# Patient Record
Sex: FEMALE | Race: WHITE | HISPANIC OR LATINO | Employment: FULL TIME | URBAN - METROPOLITAN AREA
[De-identification: names, ages, dates, MRNs, and addresses within clinical notes are randomized per-mention and may not be internally consistent; named-entity substitution may affect disease eponyms.]

---

## 2024-10-31 ENCOUNTER — ANCILLARY PROCEDURE (OUTPATIENT)
Dept: OBGYN | Facility: CLINIC | Age: 39
End: 2024-10-31

## 2024-10-31 DIAGNOSIS — N91.2 AMENORRHEA, UNSPECIFIED: ICD-10-CM

## 2024-10-31 PROCEDURE — 76801 OB US < 14 WKS SINGLE FETUS: CPT | Performed by: OBSTETRICS & GYNECOLOGY

## 2024-11-01 ENCOUNTER — HOSPITAL ENCOUNTER (OUTPATIENT)
Facility: MEDICAL CENTER | Age: 39
End: 2024-11-01
Attending: MIDWIFE
Payer: COMMERCIAL

## 2024-11-01 ENCOUNTER — APPOINTMENT (OUTPATIENT)
Dept: OBGYN | Facility: CLINIC | Age: 39
End: 2024-11-01

## 2024-11-01 ENCOUNTER — INITIAL PRENATAL (OUTPATIENT)
Dept: OBGYN | Facility: CLINIC | Age: 39
End: 2024-11-01

## 2024-11-01 VITALS
DIASTOLIC BLOOD PRESSURE: 78 MMHG | SYSTOLIC BLOOD PRESSURE: 110 MMHG | WEIGHT: 165.4 LBS | BODY MASS INDEX: 29.3 KG/M2 | HEIGHT: 63 IN

## 2024-11-01 DIAGNOSIS — O09.519 ADVANCED MATERNAL AGE, PRIMIGRAVIDA, ANTEPARTUM: ICD-10-CM

## 2024-11-01 DIAGNOSIS — N89.8 VAGINAL ITCHING: ICD-10-CM

## 2024-11-01 DIAGNOSIS — O21.9 NAUSEA AND VOMITING IN PREGNANCY: ICD-10-CM

## 2024-11-01 PROCEDURE — 0500F INITIAL PRENATAL CARE VISIT: CPT | Performed by: MIDWIFE

## 2024-11-01 PROCEDURE — 3078F DIAST BP <80 MM HG: CPT | Performed by: MIDWIFE

## 2024-11-01 PROCEDURE — 3074F SYST BP LT 130 MM HG: CPT | Performed by: MIDWIFE

## 2024-11-01 RX ORDER — ONDANSETRON 4 MG/1
4 TABLET, FILM COATED ORAL EVERY 4 HOURS PRN
Qty: 20 TABLET | Refills: 0 | Status: SHIPPED | OUTPATIENT
Start: 2024-11-01

## 2024-11-01 ASSESSMENT — EDINBURGH POSTNATAL DEPRESSION SCALE (EPDS)
I HAVE BEEN SO UNHAPPY THAT I HAVE BEEN CRYING: ONLY OCCASIONALLY
I HAVE BEEN ABLE TO LAUGH AND SEE THE FUNNY SIDE OF THINGS: AS MUCH AS I ALWAYS COULD
I HAVE LOOKED FORWARD WITH ENJOYMENT TO THINGS: AS MUCH AS I EVER DID
I HAVE BEEN SO UNHAPPY THAT I HAVE HAD DIFFICULTY SLEEPING: NOT AT ALL
I HAVE FELT SAD OR MISERABLE: NO, NOT AT ALL
TOTAL SCORE: 6
THINGS HAVE BEEN GETTING ON TOP OF ME: NO, MOST OF THE TIME I HAVE COPED QUITE WELL
I HAVE BEEN ANXIOUS OR WORRIED FOR NO GOOD REASON: YES, SOMETIMES
I HAVE BLAMED MYSELF UNNECESSARILY WHEN THINGS WENT WRONG: YES, SOME OF THE TIME
I HAVE FELT SCARED OR PANICKY FOR NO GOOD REASON: NO, NOT AT ALL
THE THOUGHT OF HARMING MYSELF HAS OCCURRED TO ME: NEVER

## 2024-11-01 NOTE — LETTER
November 1, 2024              Lina Lazo is currently being cared for at [facility].  Her hours/activities need to be modified.  She should not lift over 20 pounds repetitively.          Thank you,          Nai Goff C.N.M.    Electronically Signed

## 2024-11-01 NOTE — PROGRESS NOTES
Risk factors:   AMA  Referrals made today:   None    Subjective:   Lina Lazo is a 38 y.o.  who presents for her new OB exam.  She is 10w1d with an MARCELO of Estimated Date of Delivery: 25 by LMP c/w 10 wk US.   She is feeling well but is having daily headaches - drinking one liter water daily. Reports pelvic pain since ultrasound yesterday - denies vaginal bleeding but has vaginal itching and odor. Reports vomiting everyday - occurs spontaneously without specific triggers. She reports no ER visits or previous care in this pregnancy. Reports no fetal movement.     Vaginal bleeding denies  Pain   Reports - see HPI  Cramping  denies  History of thyroid disease denies  History of high blood pressure denies  History of uterine surgery denies    Pre-eclampsia risk factors:   1 of the following: No Previous pregnancy with pre-eclampsia, Type 1 or 2 diabetes, Chronic hypertension, Multifetal gestation, Antiphospholipid syndrome, and Systemic Lupus erythematosus  2 of the following: nulliparity and Age greater than or equal to 35 years    Diabetes risk factors:   One of the following: Greater than 36 yo    BMI greater than or equal to 25 and one of the following: First degree relative with diabetes and High risk ethnicity    FOB is involved. His name is Carlitos.    Pregnancy is  planned and desired.    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     Denies alcohol use, drug use, or tobacco use in pregnancy.     Denies any current or hx of sexual, emotional or physical abuse or trauma.     Current Medications: PNV    Allergies: Denies    Past Medical History:   Diagnosis Date    Allergy        OB History    Para Term  AB Living   1             SAB IAB Ectopic Molar Multiple Live Births                    # Outcome Date GA Lbr Balbir/2nd Weight Sex Type Anes PTL Lv   1 Current                 Past Surgical History:   Procedure  "Laterality Date    LIPOSUCTION      2016        Gynecological History  STIs  Denies  HSV  Denies  Abnormal pap Denies    Psych History   Depression:  Denies  Anxiety   Denies  Bipolar   Denies  Postpartum Mood Changes NA      Family History   Problem Relation Age of Onset    No Known Problems Mother     Cancer Father     Diabetes Father     No Known Problems Maternal Grandmother     No Known Problems Maternal Grandfather     No Known Problems Paternal Grandmother     No Known Problems Paternal Grandfather      Reports down syndrome in patient's brother genetic disorders in family history.     Objective:      Vitals:    11/01/24 1241 11/01/24 1252   BP: 110/78    Weight: 165 lb 6.4 oz    Height:  5' 3\"        Physical Exam  Vitals reviewed. Exam conducted with a chaperone present.   Constitutional:       General: She is not in acute distress.     Appearance: Normal appearance.   HENT:      Head: Normocephalic.   Neck:      Thyroid: No thyroid mass, thyromegaly or thyroid tenderness.   Cardiovascular:      Rate and Rhythm: Normal rate and regular rhythm.      Heart sounds: Normal heart sounds.   Pulmonary:      Effort: Pulmonary effort is normal.      Breath sounds: Normal breath sounds.   Chest:   Breasts:     Right: Normal.      Left: Normal.   Abdominal:      General: Abdomen is flat.      Palpations: Abdomen is soft.      Tenderness: There is no abdominal tenderness.   Genitourinary:     General: Normal vulva.      Exam position: Lithotomy position.      Labia:         Right: No rash, tenderness, lesion or injury.         Left: No rash, tenderness, lesion or injury.       Urethra: No prolapse, urethral pain, urethral swelling or urethral lesion.      Vagina: Normal.      Cervix: Normal.      Uterus: Normal.       Adnexa: Right adnexa normal and left adnexa normal.      Rectum: Normal.   Musculoskeletal:         General: Normal range of motion.      Cervical back: Normal range of motion.   Lymphadenopathy:      " Cervical: No cervical adenopathy.      Upper Body:      Right upper body: No supraclavicular, axillary or pectoral adenopathy.      Left upper body: No supraclavicular, axillary or pectoral adenopathy.      Lower Body: No right inguinal adenopathy. No left inguinal adenopathy.   Skin:     General: Skin is warm and dry.   Neurological:      Mental Status: She is alert and oriented to person, place, and time.   Psychiatric:         Attention and Perception: Attention normal.         Mood and Affect: Mood normal.         Speech: Speech normal.         Behavior: Behavior normal.         Cognition and Memory: Cognition normal.       Bedside US shows SIUP with positive cardiac activity      See Prenatal Flowsheet for vitals    A/P:     Problem List Items Addressed This Visit          Unprioritized    Advanced maternal age, primigravida, antepartum     Assessment:  1. 38 y.o.  with IUP @ 10w1d per LMP  2.  S=D  3. See problem list for conditions affecting pregnancy    Plan:   - Pap smear and GC/CT swab collected today   - Discussed carrier screening and genetic testing in pregnancy. Patient desires AFP tetra  - Prenatal labs ordered - lab slip provided  - Discussed PNV, nutrition, adequate water intake, and exercise/weight gain in pregnancy  - S/sx of pregnancy warning signs and PTL precautions given  - Patient is a candidate for low dose aspirin use in pregnancy. She is educated on Aspirin 81mg once daily from 12-36 wks of pregnancy.  - Patient is a candidate for early diabetes testing in pregnancy. A one hour glucose tolerance test is ordered today.  - Discuss headaches: Increase hydration and add elytes. Start magnesium oxide 400mg daily to prevent headaches.   - Discuss vaginal infection prevention. Vaginal pathogen collected  - Discuss prevention of vomiting. Advise Unisom nightly. Zofran prn.   - Complete OB US in 10 wks  - Return to clinic in 4 weeks.           Relevant Orders    US-OB 2ND 3RD TRI COMPLETE     URINE DRUG SCREEN W/CONF (AR)    PREG CNTR PRENATAL PN    THINPREP PAP W/HPV AND CTNG    GLUCOSE 1HR GESTATIONAL    US-OB 2ND 3RD TRI COMPLETE     Other Visit Diagnoses       Vaginal itching        Relevant Orders    VAGINAL PATHOGENS DNA PANEL    Nausea and vomiting in pregnancy        Relevant Medications    ondansetron (ZOFRAN) 4 MG Tab tablet             ELLEN Mtaias.N.M.

## 2024-11-01 NOTE — ASSESSMENT & PLAN NOTE
Assessment:  1. 38 y.o.  with IUP @ 10w1d per LMP  2.  S=D  3. See problem list for conditions affecting pregnancy    Plan:   - Pap smear and GC/CT swab collected today   - Discussed carrier screening and genetic testing in pregnancy. Patient desires AFP tetra  - Prenatal labs ordered - lab slip provided  - Discussed PNV, nutrition, adequate water intake, and exercise/weight gain in pregnancy  - S/sx of pregnancy warning signs and PTL precautions given  - Patient is a candidate for low dose aspirin use in pregnancy. She is educated on Aspirin 81mg once daily from 12-36 wks of pregnancy.  - Patient is a candidate for early diabetes testing in pregnancy. A one hour glucose tolerance test is ordered today.  - Discuss headaches: Increase hydration and add elytes. Start magnesium oxide 400mg daily to prevent headaches.   - Discuss vaginal infection prevention. Vaginal pathogen collected  - Discuss prevention of vomiting. Advise Unisom nightly. Zofran prn.   - Complete OB US in 10 wks  - Return to clinic in 4 weeks.

## 2024-11-01 NOTE — PATIENT INSTRUCTIONS
Please start taking Low dose Aspirin (81 mg) daily from 12 weeks pregnancy to 36 weeks pregnancy. You will be 12 weeks pregnant on 11/14/24.    Take Magnesium Oxide 400 mg daily for headache prevention. Drink 3-4 liters of water daily with electrolytes.     Prevention of vaginal infections: avoiding scented products on or around vagina, switching to different condoms if used, avoid douching, and to use different razor each time if shaving pubic hair and or change to waxing instead.

## 2024-11-01 NOTE — PROGRESS NOTES
Pt here for NOB apt.   Pt states : vaginal pain, and headaches    (OB Prenatal Package Plan) ?  Yes   Last pap : has not had one done but will get one today  LMP : 08/22/2024  MARCELO : 05/29/2025 by LMP   US : 10/31/2024  GA today : 10w 1d by LMP  EPDS : 6  Pt is interested in genetic testing (AFP)  Phone : [phone]   Pharm verified.   Vaginal Symptoms : none   Flu Vaccine : possibly next visit    : [id number]

## 2024-11-02 LAB
C TRACH DNA GENITAL QL NAA+PROBE: NEGATIVE
CANDIDA DNA VAG QL PROBE+SIG AMP: NEGATIVE
G VAGINALIS DNA VAG QL PROBE+SIG AMP: NEGATIVE
N GONORRHOEA DNA GENITAL QL NAA+PROBE: NEGATIVE
SPECIMEN SOURCE: NORMAL
T VAGINALIS DNA VAG QL PROBE+SIG AMP: NEGATIVE

## 2024-11-09 LAB
HPV I/H RISK 1 DNA SPEC QL NAA+PROBE: NOT DETECTED
SPECIMEN SOURCE: NORMAL
THINPREP PAP, CYTOLOGY NL11781: NORMAL

## 2024-12-11 ENCOUNTER — APPOINTMENT (OUTPATIENT)
Dept: OBGYN | Facility: CLINIC | Age: 39
End: 2024-12-11
Payer: MEDICAID

## 2024-12-11 VITALS — DIASTOLIC BLOOD PRESSURE: 74 MMHG | SYSTOLIC BLOOD PRESSURE: 110 MMHG | BODY MASS INDEX: 29.41 KG/M2 | WEIGHT: 166 LBS

## 2024-12-11 DIAGNOSIS — O09.519 ADVANCED MATERNAL AGE, PRIMIGRAVIDA, ANTEPARTUM: ICD-10-CM

## 2024-12-11 PROCEDURE — 0502F SUBSEQUENT PRENATAL CARE: CPT | Performed by: NURSE PRACTITIONER

## 2024-12-11 PROCEDURE — 3078F DIAST BP <80 MM HG: CPT | Performed by: NURSE PRACTITIONER

## 2024-12-11 PROCEDURE — 3074F SYST BP LT 130 MM HG: CPT | Performed by: NURSE PRACTITIONER

## 2024-12-11 NOTE — PROGRESS NOTES
OB follow up   + fetal movement.  No VB, LOF or UC's.  Phone # [phone]  Preferred pharmacy confirmed.  PNP, 1 GTT not done yet  US scheduled for 1/20/24

## 2024-12-12 ENCOUNTER — HOSPITAL ENCOUNTER (OUTPATIENT)
Dept: LAB | Facility: MEDICAL CENTER | Age: 39
End: 2024-12-12
Attending: NURSE PRACTITIONER
Payer: COMMERCIAL

## 2024-12-12 ENCOUNTER — HOSPITAL ENCOUNTER (OUTPATIENT)
Dept: LAB | Facility: MEDICAL CENTER | Age: 39
End: 2024-12-12
Attending: MIDWIFE
Payer: COMMERCIAL

## 2024-12-12 DIAGNOSIS — O09.519 ADVANCED MATERNAL AGE, PRIMIGRAVIDA, ANTEPARTUM: ICD-10-CM

## 2024-12-12 LAB
ABO GROUP BLD: NORMAL
BLD GP AB SCN SERPL QL: NORMAL
RH BLD: NORMAL

## 2024-12-13 LAB
ERYTHROCYTE [DISTWIDTH] IN BLOOD BY AUTOMATED COUNT: 44.7 FL (ref 35.9–50)
GLUCOSE 1H P 50 G GLC PO SERPL-MCNC: 107 MG/DL (ref 70–139)
HBV SURFACE AG SER QL: ABNORMAL
HCT VFR BLD AUTO: 37.7 % (ref 37–47)
HCV AB SER QL: ABNORMAL
HGB BLD-MCNC: 13.4 G/DL (ref 12–16)
HIV 1+2 AB+HIV1 P24 AG SERPL QL IA: NORMAL
MCH RBC QN AUTO: 33.3 PG (ref 27–33)
MCHC RBC AUTO-ENTMCNC: 35.5 G/DL (ref 32.2–35.5)
MCV RBC AUTO: 93.5 FL (ref 81.4–97.8)
PLATELET # BLD AUTO: 181 K/UL (ref 164–446)
PMV BLD AUTO: 10.9 FL (ref 9–12.9)
RBC # BLD AUTO: 4.03 M/UL (ref 4.2–5.4)
RUBV AB SER QL: 130 IU/ML
T PALLIDUM AB SER QL IA: ABNORMAL
WBC # BLD AUTO: 11 K/UL (ref 4.8–10.8)

## 2024-12-14 LAB
# FETUSES US: NORMAL
AFP MOM SERPL: 0.97
AFP SERPL-MCNC: 31 NG/ML
AGE - REPORTED: 37.5 YR
AMPHET CTO UR CFM-MCNC: NEGATIVE NG/ML
BACTERIA UR CULT: NORMAL
BARBITURATES CTO UR CFM-MCNC: NEGATIVE NG/ML
BENZODIAZ CTO UR CFM-MCNC: NEGATIVE NG/ML
CANNABINOIDS CTO UR CFM-MCNC: NEGATIVE NG/ML
COCAINE CTO UR CFM-MCNC: NEGATIVE NG/ML
CREAT UR-MCNC: 163.9 MG/DL (ref 20–400)
CURRENT SMOKER: NO
DRUG COMMENT 753798: NORMAL
FAMILY MEMBER DISEASES HX: NO
GA METHOD: NORMAL
GA: NORMAL WK
HCG MOM SERPL: 1.2
HCG SERPL-ACNC: NORMAL IU/L
HX OF HEREDITARY DISORDERS: NO
IDDM PATIENT QL: NO
INHIBIN A MOM SERPL: 1.3
INHIBIN A SERPL-MCNC: 215 PG/ML
INTEGRATED SCN PATIENT-IMP: NORMAL
METHADONE CTO UR CFM-MCNC: NEGATIVE NG/ML
OPIATES CTO UR CFM-MCNC: NEGATIVE NG/ML
PATHOLOGY STUDY: NORMAL
PCP CTO UR CFM-MCNC: NEGATIVE NG/ML
PROPOXYPH CTO UR CFM-MCNC: NEGATIVE NG/ML
SIGNIFICANT IND 70042: NORMAL
SITE SITE: NORMAL
SOURCE SOURCE: NORMAL
SPECIMEN DRAWN SERPL: NORMAL
U ESTRIOL MOM SERPL: 0.83
U ESTRIOL SERPL-MCNC: 1.03 NG/ML

## 2025-01-08 ENCOUNTER — ROUTINE PRENATAL (OUTPATIENT)
Dept: OBGYN | Facility: CLINIC | Age: 40
End: 2025-01-08

## 2025-01-08 VITALS — DIASTOLIC BLOOD PRESSURE: 70 MMHG | WEIGHT: 168 LBS | BODY MASS INDEX: 29.76 KG/M2 | SYSTOLIC BLOOD PRESSURE: 110 MMHG

## 2025-01-08 DIAGNOSIS — O09.519 ADVANCED MATERNAL AGE, PRIMIGRAVIDA, ANTEPARTUM: ICD-10-CM

## 2025-01-08 PROCEDURE — 0502F SUBSEQUENT PRENATAL CARE: CPT | Performed by: NURSE PRACTITIONER

## 2025-01-08 PROCEDURE — 3074F SYST BP LT 130 MM HG: CPT | Performed by: NURSE PRACTITIONER

## 2025-01-08 PROCEDURE — 3078F DIAST BP <80 MM HG: CPT | Performed by: NURSE PRACTITIONER

## 2025-01-08 NOTE — PROGRESS NOTES
OB follow up   no fetal movement yet.  No VB, LOF or UC's.  Phone # [phone]  Preferred pharmacy confirmed.  PNP, AFP tetra done. US scheduled for 1/20/24  C/o pain on hands, and also numbness

## 2025-01-20 ENCOUNTER — APPOINTMENT (OUTPATIENT)
Dept: OBGYN | Facility: CLINIC | Age: 40
End: 2025-01-20
Attending: MIDWIFE
Payer: MEDICAID

## 2025-01-20 VITALS
HEART RATE: 100 BPM | SYSTOLIC BLOOD PRESSURE: 128 MMHG | BODY MASS INDEX: 30.22 KG/M2 | DIASTOLIC BLOOD PRESSURE: 81 MMHG | WEIGHT: 170.6 LBS

## 2025-01-20 DIAGNOSIS — O09.519 ADVANCED MATERNAL AGE, PRIMIGRAVIDA, ANTEPARTUM: ICD-10-CM

## 2025-01-20 PROCEDURE — 76805 OB US >/= 14 WKS SNGL FETUS: CPT | Performed by: OBSTETRICS & GYNECOLOGY

## 2025-01-24 PROBLEM — O28.3 ABNORMAL PREGNANCY US: Status: ACTIVE | Noted: 2025-01-24

## 2025-01-24 NOTE — RESULT ENCOUNTER NOTE
Normal fetal anatomy and growth except for renal pelviectasis. Repeat ultrasound scheduled and patient aware.

## 2025-02-05 ENCOUNTER — ROUTINE PRENATAL (OUTPATIENT)
Dept: OBGYN | Facility: CLINIC | Age: 40
End: 2025-02-05

## 2025-02-05 VITALS — DIASTOLIC BLOOD PRESSURE: 70 MMHG | BODY MASS INDEX: 30.47 KG/M2 | WEIGHT: 172 LBS | SYSTOLIC BLOOD PRESSURE: 120 MMHG

## 2025-02-05 DIAGNOSIS — M79.89 SWELLING OF BOTH HANDS: ICD-10-CM

## 2025-02-05 DIAGNOSIS — O09.519 ADVANCED MATERNAL AGE, PRIMIGRAVIDA, ANTEPARTUM: ICD-10-CM

## 2025-02-05 PROCEDURE — 0502F SUBSEQUENT PRENATAL CARE: CPT

## 2025-02-05 PROCEDURE — 3074F SYST BP LT 130 MM HG: CPT

## 2025-02-05 PROCEDURE — 3078F DIAST BP <80 MM HG: CPT

## 2025-02-05 NOTE — PROGRESS NOTES
Pt here today for OBFV   +FM movemnet  No VB, LOF. 's   Phone number [phone] (home)   Pharmacy verified   3rd trimester lab orders pended and instructions given to patient  US 3/10

## 2025-02-05 NOTE — PROGRESS NOTES
S: Pt is a 39 y.o.  at 23w6d gestation here today for routine prenatal care. Pt reports fetal movement; denies cramping, vaginal bleeding, and leaking of fluid. Concerns today include lots of pain in her hands. Reports increased swelling, takes Tylenol with little relief.       O: /70   Wt 172 lb    *see prenatal flowsheet*     A: IUP at 23w6d       S=D         Patient Active Problem List    Diagnosis Date Noted    Swelling of both hands 2025    Abnormal pregnancy US 2025    Advanced maternal age, primigravida, antepartum 2024       P:   -Discussed normal s/sx pregnancy appropriate for gestational age general discomforts vs warning signs that necessitate evaluation in OB triage. Reviewed fetal movements appropriate for EGA. Reinforced adequate hydration and nutrition.  -Discussed relief measures for hand swelling and carpal tunnel, including wrist brace, increased hydration, raising hands to drain fluid, etc.   -Baby aspirin daily for preE prophylaxis  -Has growth scan scheduled at 32 weeks (3/10) for AMA  -Third trimester labs ordered today to complete in 2-3 weeks  -RTC in 4 weeks for routine prenatal care      Molly Oleary C.N.M.

## 2025-02-24 ENCOUNTER — HOSPITAL ENCOUNTER (OUTPATIENT)
Dept: LAB | Facility: MEDICAL CENTER | Age: 40
End: 2025-02-24
Payer: COMMERCIAL

## 2025-02-24 DIAGNOSIS — O09.519 ADVANCED MATERNAL AGE, PRIMIGRAVIDA, ANTEPARTUM: ICD-10-CM

## 2025-02-24 LAB
ERYTHROCYTE [DISTWIDTH] IN BLOOD BY AUTOMATED COUNT: 48.2 FL (ref 35.9–50)
GLUCOSE 1H P 50 G GLC PO SERPL-MCNC: 154 MG/DL (ref 70–139)
HCT VFR BLD AUTO: 37.3 % (ref 37–47)
HGB BLD-MCNC: 12.6 G/DL (ref 12–16)
MCH RBC QN AUTO: 32.7 PG (ref 27–33)
MCHC RBC AUTO-ENTMCNC: 33.8 G/DL (ref 32.2–35.5)
MCV RBC AUTO: 96.9 FL (ref 81.4–97.8)
PLATELET # BLD AUTO: 172 K/UL (ref 164–446)
PMV BLD AUTO: 11.7 FL (ref 9–12.9)
RBC # BLD AUTO: 3.85 M/UL (ref 4.2–5.4)
T PALLIDUM AB SER QL IA: NORMAL
WBC # BLD AUTO: 12.2 K/UL (ref 4.8–10.8)

## 2025-02-26 ENCOUNTER — RESULTS FOLLOW-UP (OUTPATIENT)
Dept: OBGYN | Facility: CLINIC | Age: 40
End: 2025-02-26
Payer: MEDICAID

## 2025-02-26 DIAGNOSIS — O99.810 ABNORMAL GLUCOSE AFFECTING PREGNANCY: ICD-10-CM

## 2025-02-27 NOTE — TELEPHONE ENCOUNTER
----- Message from Midwife Molly Oleary C.N.M. sent at 2/26/2025  7:59 PM PST -----  Please call to notify pt of elevated 1hr glucose test, 3hr has been ordered.      Pt notified of abnormal 1hr gtt and need to do 3hr gtt this time. Pt instructed to fast 10-12hr prior to testing. Pt informed she is only allow to drink plain water during fasting time. Advised to bring a snack for after the test is done. Pt notified will be staying in the labs for the 3hr. Pt agreed to do it tomorrow 2/28/2025. Pt verbalized understanding.

## 2025-03-06 ENCOUNTER — APPOINTMENT (OUTPATIENT)
Dept: OBGYN | Facility: CLINIC | Age: 40
End: 2025-03-06
Payer: MEDICAID

## 2025-03-06 VITALS — WEIGHT: 176.6 LBS | DIASTOLIC BLOOD PRESSURE: 80 MMHG | BODY MASS INDEX: 31.28 KG/M2 | SYSTOLIC BLOOD PRESSURE: 118 MMHG

## 2025-03-06 DIAGNOSIS — R60.9 SWOLLEN: ICD-10-CM

## 2025-03-06 DIAGNOSIS — O09.519 ADVANCED MATERNAL AGE, PRIMIGRAVIDA, ANTEPARTUM: ICD-10-CM

## 2025-03-06 LAB
APPEARANCE UR: CLEAR
BILIRUB UR STRIP-MCNC: NEGATIVE MG/DL
COLOR UR AUTO: NORMAL
GLUCOSE UR STRIP.AUTO-MCNC: NEGATIVE MG/DL
KETONES UR STRIP.AUTO-MCNC: NEGATIVE MG/DL
LEUKOCYTE ESTERASE UR QL STRIP.AUTO: NEGATIVE
NITRITE UR QL STRIP.AUTO: NEGATIVE
PH UR STRIP.AUTO: 6 [PH] (ref 5–8)
PROT UR QL STRIP: NEGATIVE MG/DL
RBC UR QL AUTO: NEGATIVE
SP GR UR STRIP.AUTO: 1.03
UROBILINOGEN UR STRIP-MCNC: 0.2 MG/DL

## 2025-03-06 PROCEDURE — 90471 IMMUNIZATION ADMIN: CPT | Performed by: PHYSICIAN ASSISTANT

## 2025-03-06 PROCEDURE — 90715 TDAP VACCINE 7 YRS/> IM: CPT | Mod: JZ | Performed by: PHYSICIAN ASSISTANT

## 2025-03-06 PROCEDURE — 81002 URINALYSIS NONAUTO W/O SCOPE: CPT | Performed by: PHYSICIAN ASSISTANT

## 2025-03-06 PROCEDURE — 0502F SUBSEQUENT PRENATAL CARE: CPT | Performed by: PHYSICIAN ASSISTANT

## 2025-03-06 NOTE — PROGRESS NOTES
Pt. Here for OB/F/U, Kick Count sheet given and explained to pt.   Growth U/S on 3/10/2025  Good FM  Good # [phone]   Pt states her hands and ankles very swollen and stomach gets really.    Tdap given today   Pt states will do 3 HR GTT tomorrow.

## 2025-03-06 NOTE — PROGRESS NOTES
Pt has no complaints with cramping, bleeding or pain, though pt does have occ lower abd cramping, carlos at work, as she is working 4-12 hr days per week, all night shift and is on her feet that whole time. Pt to work on incr water, cutting back salt and try stretching, compression stockings prn, as pt has had incr swelling, likely due to slight dehydration. +FM - FKC sheet given today. 1hr , though CBC, T pall wnl - to do 3hr GTT tomorrow. PTL precautions reviewed. TDaP given today. US 3/10 for fetal pyelectasis. RTC 2-4 wk or sooner prn.

## 2025-03-06 NOTE — LETTER
Cuente los Movimientos de [place] Beb[place]  Otro paso importante para la jacki de [place] bebé    Linacecelia Griffin Lazo     [facility]            Dept: [phone]    ¿Cuántas semanas tiene de embarazo? 28w0d    Fecha cuando tiene que comenzar a contar el movimiento: 3/6/2025                  [place] debe usar irma diagrama    Lauren manera en que [place] doctor puede controlar a jacki de [place] bebé es sabiendo cuantas veces se mueve [place] bebé en el útero, o por medio de las “pataditas”.  Usted podrá ayudarle a [place] médico al usar cada día el siguiente diagrama.    Cada día, usted debe prestar atención a cuantas horas le lleva a [place] bebé moverse 10 veces.  Comience a contar en la mañana, lo antes posible después de haberse levantado.    Primeramente, escriba la hora en que se mueve [place] bebé, hasta llegar a 10 veces.  Colóquele un check o palomita a cada cuadrito cada vez que [place] bebé se mueva hasta que complete 10 veces.  Escriba la hora cuando termine de contar 10 veces en la última columna.  Sume el total del tiempo que le llevó contar los 10 movimientos.  Finalmente, complete el cuadrito de cuantas horas le llevó hacerlo.    Después de tasha contado los 10 movimientos, ya no tendrá que contar los demás movimientos por el michael del día.  A la mañana siguiente, comience a contar de nuevo cuantas veces se mueve el bebé desde el momento en que se levante.    ¿Qué tendría que considerarse un “movimiento”?  Es difícil de decirlo porque es distinto de lauren madre a otra, y de un embarazo a otro.  Lo importante es que cuente el movimiento de la misma manera mir el transcurso de [place] embarazo.  Si tiene preguntas adicionales, pregúntele a [place] doctor.    ¡Cuente cuidadosamente cada día!     MUESTRA:  Semana 28    ¿Cuántas horas le ha llevado sentir 10 movimientos?        Hora de Inicio     1     2     3     4     5     6     7     8     9     10   Hora de Finlizar   Edwina. 8:20           11:40   Mar.                Mié.               Jue.               Vie.               Sáb.               Dom.                 IMPORTANTE:  Usted debe contactar a [place] doctor si le lleva más de 2 horas sentir 10 movimientos de washing[place] bebé.    Cada mañana, escriba la hora de inicio y comience a contar los movimientos de [place] bebé.  Hágalo colocándole un check o palomita a cada cuadrito cada vez que sienta un movimiento de [place] bebé.  Cuando haya sentido 10 “pataditas”, escriba la hora en que terminó de contar en la última columna.  Luego, complete en la cajita (arriba de la delores de check o palomita) el número total de horas que le llevó hacerlo.  Asegúrese de leer completamente las instrucciones en la página anterior.

## 2025-03-07 ENCOUNTER — HOSPITAL ENCOUNTER (OUTPATIENT)
Dept: LAB | Facility: MEDICAL CENTER | Age: 40
End: 2025-03-07
Payer: COMMERCIAL

## 2025-03-07 DIAGNOSIS — O99.810 ABNORMAL GLUCOSE AFFECTING PREGNANCY: ICD-10-CM

## 2025-03-07 LAB
GLUCOSE 1H P CHAL SERPL-MCNC: 156 MG/DL (ref 65–180)
GLUCOSE 2H P CHAL SERPL-MCNC: 134 MG/DL (ref 65–155)
GLUCOSE 3H P CHAL SERPL-MCNC: 103 MG/DL (ref 65–140)
GLUCOSE BS SERPL-MCNC: 85 MG/DL (ref 65–95)

## 2025-03-10 ENCOUNTER — ANCILLARY PROCEDURE (OUTPATIENT)
Dept: OBGYN | Facility: CLINIC | Age: 40
End: 2025-03-10

## 2025-03-10 VITALS
WEIGHT: 173.4 LBS | DIASTOLIC BLOOD PRESSURE: 83 MMHG | BODY MASS INDEX: 30.72 KG/M2 | HEART RATE: 102 BPM | SYSTOLIC BLOOD PRESSURE: 124 MMHG

## 2025-03-10 DIAGNOSIS — Z34.02 ENCOUNTER FOR SUPERVISION OF NORMAL FIRST PREGNANCY IN SECOND TRIMESTER: ICD-10-CM

## 2025-03-10 PROCEDURE — 76816 OB US FOLLOW-UP PER FETUS: CPT | Performed by: OBSTETRICS & GYNECOLOGY

## 2025-04-04 ENCOUNTER — ROUTINE PRENATAL (OUTPATIENT)
Dept: OBGYN | Facility: CLINIC | Age: 40
End: 2025-04-04

## 2025-04-04 VITALS — WEIGHT: 179.2 LBS | BODY MASS INDEX: 31.74 KG/M2 | DIASTOLIC BLOOD PRESSURE: 84 MMHG | SYSTOLIC BLOOD PRESSURE: 120 MMHG

## 2025-04-04 DIAGNOSIS — O28.3 ABNORMAL PREGNANCY US: ICD-10-CM

## 2025-04-04 DIAGNOSIS — Z34.03 PRENATAL CARE, FIRST PREGNANCY IN THIRD TRIMESTER: Primary | ICD-10-CM

## 2025-04-04 PROBLEM — Z34.93 PRENATAL CARE IN THIRD TRIMESTER: Status: ACTIVE | Noted: 2025-04-04

## 2025-04-04 PROCEDURE — 0502F SUBSEQUENT PRENATAL CARE: CPT

## 2025-04-04 PROCEDURE — 3074F SYST BP LT 130 MM HG: CPT

## 2025-04-04 PROCEDURE — 3079F DIAST BP 80-89 MM HG: CPT

## 2025-04-04 NOTE — PROGRESS NOTES
S: Pt is a 39 y.o.  at 32w1d gestation here today for routine prenatal care.     Concerns today include:   Reports back and ligament pain. Reports jaw pain.      Denies: vaginal bleeding, pelvic and abdominal pain, cramping, contractions, leaking of fluid, urinary and vaginal symptoms and headaches, visual changes, epigastric pain    Pt reports fetal movement as Present     O: /84   Wt 179 lb 3.2 oz   LMP 2024 (Exact Date)   BMI 31.74 kg/m²    *see prenatal flowsheet*     Labs:     Prenatal labs: Completed and normal  GCT: 1hr 154, 3hr WNL   GBS: Not yet collected  Genetic testing: declined  STI testing: negative    Ultrasound: 3/10: ac 38%, efw 34%, allie 15.0 cm. Fetal pyelectasis resolved.    A/P:     Problem List Items Addressed This Visit       Abnormal pregnancy US    Prenatal care in third trimester - Primary    Pt is a 39 y.o.  at 32w1d gestation here today for routine prenatal care.   Size equal to dates    Discuss  labor and pre-eclampsia precautions.  Discuss FMA and FKCs  Address concerns: handout given and discussed regarding back and ligament pain. Advised a pregnancy belt. Pt advised to see a dentist for jaw pain.   GBS Not yet collected  Rh positive  Tdap: Administered prior visit.  Reviewed 3rd tri labs: yes  Desires tubal salpingectomy if a C/S necessary  for contraception postpartum. Consent signed today.  RTC 2 wks.   WIC handout given and discussed.  REMSA car seat information given and discussed.  PP contraception handout given and discussed.  [facility] class/tour information given and discussed.  Discussed NSTs starting at 36 weeks.

## 2025-04-04 NOTE — ASSESSMENT & PLAN NOTE
Pt is a 39 y.o.  at 32w1d gestation here today for routine prenatal care.   Size equal to dates    Discuss  labor and pre-eclampsia precautions.  Discuss FMA and FKCs  Address concerns: handout given and discussed regarding back and ligament pain. Advised a pregnancy belt. Pt advised to see a dentist for jaw pain.   GBS Not yet collected  Rh positive  Tdap: Administered prior visit.  Reviewed 3rd tri labs: yes  Desires tubal salpingectomy if a C/S necessary  for contraception postpartum. Consent signed today.  RTC 2 wks.   WIC handout given and discussed.  REMSA car seat information given and discussed.  PP contraception handout given and discussed.  [facility] class/tour information given and discussed.  Discussed NSTs starting at 36 weeks.

## 2025-04-04 NOTE — PROGRESS NOTES
Pt here today for OBFV   +FM movemnet  No VB, LOF. Uc's   Phone number [phone] (home)   Pharmacy verified   Pt has been experiencing jaw pain for a while now

## 2025-04-18 ENCOUNTER — ROUTINE PRENATAL (OUTPATIENT)
Dept: OBGYN | Facility: CLINIC | Age: 40
End: 2025-04-18

## 2025-04-18 VITALS — BODY MASS INDEX: 31.46 KG/M2 | WEIGHT: 177.6 LBS | DIASTOLIC BLOOD PRESSURE: 70 MMHG | SYSTOLIC BLOOD PRESSURE: 102 MMHG

## 2025-04-18 DIAGNOSIS — O26.893 HEADACHE IN PREGNANCY, ANTEPARTUM, THIRD TRIMESTER: ICD-10-CM

## 2025-04-18 DIAGNOSIS — O09.513 PRIMIGRAVIDA OF ADVANCED MATERNAL AGE IN THIRD TRIMESTER: ICD-10-CM

## 2025-04-18 DIAGNOSIS — R51.9 HEADACHE IN PREGNANCY, ANTEPARTUM, THIRD TRIMESTER: ICD-10-CM

## 2025-04-18 DIAGNOSIS — R10.2 PELVIC PRESSURE IN PREGNANCY: ICD-10-CM

## 2025-04-18 DIAGNOSIS — O26.899 PELVIC PRESSURE IN PREGNANCY: ICD-10-CM

## 2025-04-18 LAB
APPEARANCE UR: CLEAR
BILIRUB UR STRIP-MCNC: NORMAL MG/DL
COLOR UR AUTO: YELLOW
GLUCOSE UR STRIP.AUTO-MCNC: NORMAL MG/DL
KETONES UR STRIP.AUTO-MCNC: NORMAL MG/DL
LEUKOCYTE ESTERASE UR QL STRIP.AUTO: NORMAL
NITRITE UR QL STRIP.AUTO: NORMAL
PH UR STRIP.AUTO: 7 [PH] (ref 5–8)
PROT UR QL STRIP: 30 MG/DL
RBC UR QL AUTO: NORMAL
SP GR UR STRIP.AUTO: 1.02
UROBILINOGEN UR STRIP-MCNC: 0.2 MG/DL

## 2025-04-18 PROCEDURE — 3074F SYST BP LT 130 MM HG: CPT | Performed by: OBSTETRICS & GYNECOLOGY

## 2025-04-18 PROCEDURE — 3078F DIAST BP <80 MM HG: CPT | Performed by: OBSTETRICS & GYNECOLOGY

## 2025-04-18 PROCEDURE — 99213 OFFICE O/P EST LOW 20 MIN: CPT | Mod: 25 | Performed by: OBSTETRICS & GYNECOLOGY

## 2025-04-18 PROCEDURE — 81002 URINALYSIS NONAUTO W/O SCOPE: CPT | Performed by: OBSTETRICS & GYNECOLOGY

## 2025-04-18 RX ORDER — MAGNESIUM OXIDE 400 MG/1
400 TABLET ORAL DAILY
COMMUNITY

## 2025-04-18 NOTE — PROGRESS NOTES
Pt. Here for OB/FU.   34w1d  Reports Good FM.   Pt. Denies VB, LOF, or UC's.     Pt states she has some light contractions/BH. She also hears ringing in her ears sometimes. She feels pressure on her lower vaginal area after she urination, denies burning/irritation.   3 hr glucose WNL.     Phone/Pharm verified.    [phone]    Interpretor: [id number] - Sophia

## 2025-04-18 NOTE — PROGRESS NOTES
OB Visit Note - 34w1d     MEDICAL DECISION MAKING:  Lina Lazo is a 39 y.o. female  at 34w1d presents for return OB visit.  Her pregnancy is notable for advanced maternal age.  The patient has chronic headaches during the pregnancy and continues to have headaches.  She denies other symptoms of preeclampsia with no abdominal pain.  The patient has a normal blood pressure.  She does have trace pedal edema.  The patient does report good fetal movement, no vaginal bleeding and no dysuria however she does have pressure with voiding.    Examination:  General-no acute distress  Abdomen-soft, nondistended, nontender  Feeding fundal height was 33 cm  Fetal heart tones-140 beats minute  Extremities-nontender with trace pedal edema.  Urine dip was normal    Assessment/plan    Today's visit addressed:   1.  Advanced maternal age-the patient will be scheduled to start weekly NSTs with her next visit at 36 weeks.  2.  The patient had initial pyelectasis noted on ultrasound however this is apparently resolved with her last ultrasounds.  3.  The patient did sign sterilization consent should she need a .  4.  The patient has chronic headaches during her pregnancy with normal blood pressure.  She was counseled regarding follow-up should her symptoms change as chronic headaches may mask of the initial signs of preeclampsia.    Pregnancy complicated by:  Patient Active Problem List   Diagnosis    Advanced maternal age, primigravida, antepartum    Abnormal pregnancy US    Swelling of both hands    Prenatal care in third trimester       The patient will follow up in 2 week(s).  The patient has chronic headaches and was counseled to follow-up if those headaches worsen or if she has worsening edema or abdominal pain.  She understands that this potentially could mask signs of preeclampsia.  She will be scheduled for weekly NSTs.  She was counseled to call or return for vaginal bleeding, regular contractions,  leakage of fluid or decreased fetal movement.    Berny Barajas M.D.

## 2025-04-25 ENCOUNTER — ROUTINE PRENATAL (OUTPATIENT)
Dept: OBGYN | Facility: CLINIC | Age: 40
End: 2025-04-25

## 2025-04-25 VITALS — SYSTOLIC BLOOD PRESSURE: 122 MMHG | WEIGHT: 183.4 LBS | BODY MASS INDEX: 32.49 KG/M2 | DIASTOLIC BLOOD PRESSURE: 84 MMHG

## 2025-04-25 DIAGNOSIS — O09.519 ADVANCED MATERNAL AGE, PRIMIGRAVIDA, ANTEPARTUM: ICD-10-CM

## 2025-04-25 PROCEDURE — 3074F SYST BP LT 130 MM HG: CPT | Performed by: PHYSICIAN ASSISTANT

## 2025-04-25 PROCEDURE — 0502F SUBSEQUENT PRENATAL CARE: CPT | Performed by: PHYSICIAN ASSISTANT

## 2025-04-25 PROCEDURE — 3079F DIAST BP 80-89 MM HG: CPT | Performed by: PHYSICIAN ASSISTANT

## 2025-04-25 NOTE — PROGRESS NOTES
Pt. Here for OB/FU. Reports Good FM.   Good # [phone]  Pt states has been having nausea, belly button pain and some cramping.

## 2025-04-25 NOTE — PROGRESS NOTES
Pt has no complaints with regular or strong UCs, Vb, LOF. +FM. GBS next visit, will also need NST weekly for AMA, pt receptive. Daily FKC recommend, PTL precautions stressed. RTC 1 wk or sooner prn.

## 2025-05-02 ENCOUNTER — ROUTINE PRENATAL (OUTPATIENT)
Dept: OBGYN | Facility: CLINIC | Age: 40
End: 2025-05-02
Payer: MEDICAID

## 2025-05-02 ENCOUNTER — HOSPITAL ENCOUNTER (OUTPATIENT)
Facility: MEDICAL CENTER | Age: 40
End: 2025-05-02
Attending: ADVANCED PRACTICE MIDWIFE

## 2025-05-02 VITALS — WEIGHT: 180 LBS | SYSTOLIC BLOOD PRESSURE: 120 MMHG | BODY MASS INDEX: 31.89 KG/M2 | DIASTOLIC BLOOD PRESSURE: 82 MMHG

## 2025-05-02 DIAGNOSIS — Z34.03 SUPERVISION OF NORMAL FIRST PREGNANCY IN THIRD TRIMESTER: ICD-10-CM

## 2025-05-02 DIAGNOSIS — O09.519 ADVANCED MATERNAL AGE, PRIMIGRAVIDA, ANTEPARTUM: ICD-10-CM

## 2025-05-02 DIAGNOSIS — Z34.03 PRENATAL CARE, FIRST PREGNANCY IN THIRD TRIMESTER: ICD-10-CM

## 2025-05-02 PROCEDURE — 59025 FETAL NON-STRESS TEST: CPT | Performed by: ADVANCED PRACTICE MIDWIFE

## 2025-05-02 PROCEDURE — 0502F SUBSEQUENT PRENATAL CARE: CPT | Performed by: ADVANCED PRACTICE MIDWIFE

## 2025-05-02 PROCEDURE — 87081 CULTURE SCREEN ONLY: CPT

## 2025-05-02 PROCEDURE — 87150 DNA/RNA AMPLIFIED PROBE: CPT

## 2025-05-02 RX ORDER — ONDANSETRON 4 MG/1
4 TABLET, FILM COATED ORAL EVERY 4 HOURS PRN
Qty: 10 TABLET | Refills: 0 | Status: ON HOLD | OUTPATIENT
Start: 2025-05-02 | End: 2025-05-22

## 2025-05-02 NOTE — PROGRESS NOTES
Pt here today for OB follow up  Pt states states she has had diarrhea since yesterday.  Reports +FM  Good # [phone]  Pharmacy Confirmed.  Chaperone offered and not indicated.   GBS today.

## 2025-05-02 NOTE — ASSESSMENT & PLAN NOTE
Pt is a 39 y.o.  at 36w1d gestation here today for routine prenatal care.   Size equal to dates      Discuss  labor and pre-eclampsia precautions.  Discuss FMA and FKCs  Address concerns: discussed adequate hydration and importance of monitoring for contractions. We reviewed use of zofran and Rx sent for patient.   GBS Collected today  Rh positive   RTC 1 wks.

## 2025-05-02 NOTE — PROGRESS NOTES
S: Pt is a 39 y.o.  at 36w1d gestation here today for routine prenatal care.     Concerns today include:   patient reporting a few episodes of diarrhea over past 24 hours. She denies presence of sick contacts and has not ate outside of home    Denies: headaches, visual changes, epigastric pain, vaginal bleeding, and leaking of fluid    Pt reports fetal movement as Present     O: /82   Wt 180 lb   LMP 2024 (Exact Date)   BMI 31.89 kg/m²    *see prenatal flowsheet*     Labs:     Prenatal labs: Completed and normal    A/P:     Problem List Items Addressed This Visit          Unprioritized    Advanced maternal age, primigravida, antepartum    NST today         Prenatal care in third trimester    Pt is a 39 y.o.  at 36w1d gestation here today for routine prenatal care.   Size equal to dates      Discuss  labor and pre-eclampsia precautions.  Discuss FMA and FKCs  Address concerns: discussed adequate hydration and importance of monitoring for contractions. We reviewed use of zofran and Rx sent for patient.   GBS Collected today  Rh positive   RTC 1 wks.            Other Visit Diagnoses         Supervision of normal first pregnancy in third trimester        Relevant Orders    GRP B STREP, BY PCR (MORENO BROTH)

## 2025-05-03 LAB — GP B STREP DNA SPEC QL NAA+PROBE: NEGATIVE

## 2025-05-09 ENCOUNTER — ROUTINE PRENATAL (OUTPATIENT)
Dept: OBGYN | Facility: CLINIC | Age: 40
End: 2025-05-09

## 2025-05-09 VITALS — BODY MASS INDEX: 32.56 KG/M2 | DIASTOLIC BLOOD PRESSURE: 80 MMHG | SYSTOLIC BLOOD PRESSURE: 100 MMHG | WEIGHT: 183.8 LBS

## 2025-05-09 DIAGNOSIS — Z34.03 PRENATAL CARE, FIRST PREGNANCY IN THIRD TRIMESTER: Primary | ICD-10-CM

## 2025-05-09 PROCEDURE — 3074F SYST BP LT 130 MM HG: CPT

## 2025-05-09 PROCEDURE — 0502F SUBSEQUENT PRENATAL CARE: CPT

## 2025-05-09 PROCEDURE — 3079F DIAST BP 80-89 MM HG: CPT

## 2025-05-09 NOTE — ASSESSMENT & PLAN NOTE
Pt is a 39 y.o.  at 37w1d gestation here today for routine prenatal care.   Size equal to dates    Discuss Labor and pre-eclampsia precautions.  Discuss FMA and FKCs  Address concerns: none today  GBS Negative.  Rh positive  Tdap: Administered prior visit.  RTC 1 wks.

## 2025-05-09 NOTE — PROGRESS NOTES
Pt here today for OBFV   +FM movemnet  No VB, LOF. 's   Phone number [phone] (home)   Pharmacy verified   Gbs neg

## 2025-05-09 NOTE — PROGRESS NOTES
S: Pt is a 39 y.o.  at 37w1d gestation here today for routine prenatal care.     Concerns today include:  Denies concerns    Denies: vaginal bleeding, pelvic and abdominal pain, cramping, contractions, leaking of fluid, urinary and vaginal symptoms and headaches, visual changes, epigastric pain    Pt reports fetal movement as Present     O: /80   Wt 183 lb 12.8 oz   LMP 2024 (Exact Date)   BMI 32.56 kg/m²    *see prenatal flowsheet*     Labs:     Prenatal labs: Completed and normal  GCT: 1hr 154, 3hr WNL   GBS: Negative.  Genetic testing: AFP3 screen neg  STI testing: negative  VE: closed/thick/high, firm, posterior    Ultrasound: 3/10: ac 38%, efw 34%, allie 15.0 cm    Lina Lazo, a  at 37w1d with an MARCELO of 2025, by Last Menstrual Period, was seen at [facility] for a nonstress test.    Nonstress Test  Reason for NST: Other (Comment) (AMA)  Variability: Moderate  Decelerations: None  Accelerations: Yes  Acoustic Stimulator: No  Baseline: 130  Uterine Irritability: No  Contractions: Not present  Nonstress Test Interpretation  Comments: NST read by me. Reactive per my read.      A/P:     Problem List Items Addressed This Visit       Prenatal care in third trimester - Primary    Pt is a 39 y.o.  at 37w1d gestation here today for routine prenatal care.   Size equal to dates    Discuss Labor and pre-eclampsia precautions.  Discuss FMA and FKCs  Address concerns: none today  GBS Negative.  Rh positive  Tdap: Administered prior visit.  RTC 1 wks.

## 2025-05-09 NOTE — PROGRESS NOTES
Lina Lazo, a  at 37w1d with an MARCELO of 2025, by Last Menstrual Period, was seen at [facility] for a nonstress test.  ADD ON NST      Indication: MAYAA

## 2025-05-16 ENCOUNTER — ROUTINE PRENATAL (OUTPATIENT)
Dept: OBGYN | Facility: CLINIC | Age: 40
End: 2025-05-16

## 2025-05-16 VITALS — BODY MASS INDEX: 32.56 KG/M2 | DIASTOLIC BLOOD PRESSURE: 72 MMHG | WEIGHT: 183.8 LBS | SYSTOLIC BLOOD PRESSURE: 110 MMHG

## 2025-05-16 DIAGNOSIS — O09.519 ADVANCED MATERNAL AGE, PRIMIGRAVIDA, ANTEPARTUM: Primary | ICD-10-CM

## 2025-05-16 PROCEDURE — 3078F DIAST BP <80 MM HG: CPT | Performed by: PHYSICIAN ASSISTANT

## 2025-05-16 PROCEDURE — 3074F SYST BP LT 130 MM HG: CPT | Performed by: PHYSICIAN ASSISTANT

## 2025-05-16 PROCEDURE — 0502F SUBSEQUENT PRENATAL CARE: CPT | Performed by: PHYSICIAN ASSISTANT

## 2025-05-16 NOTE — PROGRESS NOTES
Pt has no complaints with cramping, UCs, Vb, LOF though pt has had some tightening of abd the past few nights this week. +FM. IOL scheduled 5/22 at 9pm - pt aware and given info today. Pt declines NST today for AMA, states baby is moving well. GBS neg, Cervix: Cl/75/-1, post, soft, vtx. Labor precautions reviewed. Daily FKC recommended. OTC heartburn remedies d/w pt, Famotidine recommended. RTC 1 wk or sooner prn.

## 2025-05-16 NOTE — PROGRESS NOTES
Chest: 2 views of the chest were obtained.

 

Comparison: Prior chest x-ray of 04/07/19.

 

Heart size and mediastinum are normal.  Lungs are clear with no acute 

parenchymal change.  Mild diffuse disc space narrowing is noted within

 the thoracic and upper lumbar spine.  No acute osseous finding is 

seen.

 

Impression:

1.  Nothing acute is appreciated on 2 view chest x-ray.

 

Diagnostic code #2

 

This report was dictated in MDT Pt. Here for OB/FU. Reports Good FM.   Good # [phone]   Pt states woke up in the middle of the night with a sharp pain but then went away, headaches, acid reflux  GBS negative   Pt is scheduled for an In-Patient IOL on 5/22/25 at 9:00 pm, pt notified and given instructions.

## 2025-05-22 ENCOUNTER — HOSPITAL ENCOUNTER (INPATIENT)
Facility: MEDICAL CENTER | Age: 40
LOS: 1 days | DRG: 833 | End: 2025-05-23
Attending: OBSTETRICS & GYNECOLOGY | Admitting: OBSTETRICS & GYNECOLOGY
Payer: MEDICAID

## 2025-05-22 ENCOUNTER — APPOINTMENT (OUTPATIENT)
Dept: OBGYN | Facility: MEDICAL CENTER | Age: 40
DRG: 833 | End: 2025-05-22
Attending: OBSTETRICS & GYNECOLOGY
Payer: MEDICAID

## 2025-05-22 LAB
BASOPHILS # BLD AUTO: 0.4 % (ref 0–1.8)
BASOPHILS # BLD: 0.04 K/UL (ref 0–0.12)
EOSINOPHIL # BLD AUTO: 0.11 K/UL (ref 0–0.51)
EOSINOPHIL NFR BLD: 1 % (ref 0–6.9)
ERYTHROCYTE [DISTWIDTH] IN BLOOD BY AUTOMATED COUNT: 45.7 FL (ref 35.9–50)
HCT VFR BLD AUTO: 34.7 % (ref 37–47)
HGB BLD-MCNC: 12 G/DL (ref 12–16)
HOLDING TUBE BB 8507: NORMAL
IMM GRANULOCYTES # BLD AUTO: 0.17 K/UL (ref 0–0.11)
IMM GRANULOCYTES NFR BLD AUTO: 1.6 % (ref 0–0.9)
LYMPHOCYTES # BLD AUTO: 2.45 K/UL (ref 1–4.8)
LYMPHOCYTES NFR BLD: 22.4 % (ref 22–41)
MCH RBC QN AUTO: 32.8 PG (ref 27–33)
MCHC RBC AUTO-ENTMCNC: 34.6 G/DL (ref 32.2–35.5)
MCV RBC AUTO: 94.8 FL (ref 81.4–97.8)
MONOCYTES # BLD AUTO: 0.83 K/UL (ref 0–0.85)
MONOCYTES NFR BLD AUTO: 7.6 % (ref 0–13.4)
NEUTROPHILS # BLD AUTO: 7.34 K/UL (ref 1.82–7.42)
NEUTROPHILS NFR BLD: 67 % (ref 44–72)
NRBC # BLD AUTO: 0.02 K/UL
NRBC BLD-RTO: 0.2 /100 WBC (ref 0–0.2)
PLATELET # BLD AUTO: 116 K/UL (ref 164–446)
PMV BLD AUTO: 12.2 FL (ref 9–12.9)
RBC # BLD AUTO: 3.66 M/UL (ref 4.2–5.4)
T PALLIDUM AB SER QL IA: NORMAL
WBC # BLD AUTO: 10.9 K/UL (ref 4.8–10.8)

## 2025-05-22 PROCEDURE — G0378 HOSPITAL OBSERVATION PER HR: HCPCS

## 2025-05-22 PROCEDURE — 36415 COLL VENOUS BLD VENIPUNCTURE: CPT

## 2025-05-22 PROCEDURE — 770002 HCHG ROOM/CARE - OB PRIVATE (112)

## 2025-05-22 PROCEDURE — A9270 NON-COVERED ITEM OR SERVICE: HCPCS | Performed by: ADVANCED PRACTICE MIDWIFE

## 2025-05-22 PROCEDURE — 3E0P7VZ INTRODUCTION OF HORMONE INTO FEMALE REPRODUCTIVE, VIA NATURAL OR ARTIFICIAL OPENING: ICD-10-PCS | Performed by: OBSTETRICS & GYNECOLOGY

## 2025-05-22 PROCEDURE — 85025 COMPLETE CBC W/AUTO DIFF WBC: CPT

## 2025-05-22 PROCEDURE — 700102 HCHG RX REV CODE 250 W/ 637 OVERRIDE(OP): Performed by: ADVANCED PRACTICE MIDWIFE

## 2025-05-22 PROCEDURE — 86780 TREPONEMA PALLIDUM: CPT

## 2025-05-22 RX ORDER — ONDANSETRON 2 MG/ML
4 INJECTION INTRAMUSCULAR; INTRAVENOUS EVERY 6 HOURS PRN
Status: DISCONTINUED | OUTPATIENT
Start: 2025-05-22 | End: 2025-05-23 | Stop reason: HOSPADM

## 2025-05-22 RX ORDER — IBUPROFEN 800 MG/1
800 TABLET, FILM COATED ORAL
Status: DISCONTINUED | OUTPATIENT
Start: 2025-05-22 | End: 2025-05-23 | Stop reason: HOSPADM

## 2025-05-22 RX ORDER — HYDROXYZINE HYDROCHLORIDE 50 MG/1
50 TABLET, FILM COATED ORAL EVERY 6 HOURS PRN
Status: DISCONTINUED | OUTPATIENT
Start: 2025-05-22 | End: 2025-05-23 | Stop reason: HOSPADM

## 2025-05-22 RX ORDER — ONDANSETRON 4 MG/1
4 TABLET, ORALLY DISINTEGRATING ORAL EVERY 6 HOURS PRN
Status: DISCONTINUED | OUTPATIENT
Start: 2025-05-22 | End: 2025-05-23 | Stop reason: HOSPADM

## 2025-05-22 RX ORDER — OXYTOCIN 10 [USP'U]/ML
10 INJECTION, SOLUTION INTRAMUSCULAR; INTRAVENOUS
Status: DISCONTINUED | OUTPATIENT
Start: 2025-05-22 | End: 2025-05-23 | Stop reason: HOSPADM

## 2025-05-22 RX ORDER — ACETAMINOPHEN 500 MG
1000 TABLET ORAL
Status: DISCONTINUED | OUTPATIENT
Start: 2025-05-22 | End: 2025-05-23 | Stop reason: HOSPADM

## 2025-05-22 RX ORDER — TERBUTALINE SULFATE 1 MG/ML
0.25 INJECTION SUBCUTANEOUS
Status: DISCONTINUED | OUTPATIENT
Start: 2025-05-22 | End: 2025-05-23 | Stop reason: HOSPADM

## 2025-05-22 RX ORDER — LIDOCAINE HYDROCHLORIDE 10 MG/ML
20 INJECTION, SOLUTION INFILTRATION; PERINEURAL
Status: DISCONTINUED | OUTPATIENT
Start: 2025-05-22 | End: 2025-05-23 | Stop reason: HOSPADM

## 2025-05-22 RX ORDER — SODIUM CHLORIDE, SODIUM LACTATE, POTASSIUM CHLORIDE, CALCIUM CHLORIDE 600; 310; 30; 20 MG/100ML; MG/100ML; MG/100ML; MG/100ML
INJECTION, SOLUTION INTRAVENOUS CONTINUOUS
Status: DISCONTINUED | OUTPATIENT
Start: 2025-05-22 | End: 2025-05-23 | Stop reason: HOSPADM

## 2025-05-22 RX ADMIN — MISOPROSTOL 25 MCG: 100 TABLET ORAL at 22:31

## 2025-05-22 ASSESSMENT — PAIN SCALES - GENERAL: PAINLEVEL: 0 - NO PAIN

## 2025-05-23 VITALS
DIASTOLIC BLOOD PRESSURE: 72 MMHG | HEART RATE: 83 BPM | BODY MASS INDEX: 32.43 KG/M2 | OXYGEN SATURATION: 96 % | WEIGHT: 183 LBS | HEIGHT: 63 IN | TEMPERATURE: 97 F | RESPIRATION RATE: 20 BRPM | SYSTOLIC BLOOD PRESSURE: 121 MMHG

## 2025-05-23 PROCEDURE — 59025 FETAL NON-STRESS TEST: CPT

## 2025-05-23 PROCEDURE — G0378 HOSPITAL OBSERVATION PER HR: HCPCS

## 2025-05-23 PROCEDURE — A9270 NON-COVERED ITEM OR SERVICE: HCPCS | Performed by: ADVANCED PRACTICE MIDWIFE

## 2025-05-23 PROCEDURE — A9270 NON-COVERED ITEM OR SERVICE: HCPCS | Performed by: OBSTETRICS & GYNECOLOGY

## 2025-05-23 PROCEDURE — 36415 COLL VENOUS BLD VENIPUNCTURE: CPT

## 2025-05-23 PROCEDURE — 700102 HCHG RX REV CODE 250 W/ 637 OVERRIDE(OP): Performed by: OBSTETRICS & GYNECOLOGY

## 2025-05-23 PROCEDURE — 700102 HCHG RX REV CODE 250 W/ 637 OVERRIDE(OP): Performed by: ADVANCED PRACTICE MIDWIFE

## 2025-05-23 RX ADMIN — MISOPROSTOL 25 MCG: 100 TABLET ORAL at 10:20

## 2025-05-23 RX ADMIN — MISOPROSTOL 25 MCG: 100 TABLET ORAL at 02:53

## 2025-05-23 SDOH — ECONOMIC STABILITY: TRANSPORTATION INSECURITY
IN THE PAST 12 MONTHS, HAS THE LACK OF TRANSPORTATION KEPT YOU FROM MEDICAL APPOINTMENTS OR FROM GETTING MEDICATIONS?: YES

## 2025-05-23 SDOH — ECONOMIC STABILITY: TRANSPORTATION INSECURITY
IN THE PAST 12 MONTHS, HAS LACK OF RELIABLE TRANSPORTATION KEPT YOU FROM MEDICAL APPOINTMENTS, MEETINGS, WORK OR FROM GETTING THINGS NEEDED FOR DAILY LIVING?: YES

## 2025-05-23 ASSESSMENT — PAIN DESCRIPTION - PAIN TYPE
TYPE: ACUTE PAIN

## 2025-05-23 ASSESSMENT — LIFESTYLE VARIABLES
ALCOHOL_USE: NO
EVER FELT BAD OR GUILTY ABOUT YOUR DRINKING: NO
TOTAL SCORE: 0
EVER HAD A DRINK FIRST THING IN THE MORNING TO STEADY YOUR NERVES TO GET RID OF A HANGOVER: NO
HAVE PEOPLE ANNOYED YOU BY CRITICIZING YOUR DRINKING: NO
CONSUMPTION TOTAL: INCOMPLETE
HAVE YOU EVER FELT YOU SHOULD CUT DOWN ON YOUR DRINKING: NO
TOTAL SCORE: 0
TOTAL SCORE: 0

## 2025-05-23 ASSESSMENT — PATIENT HEALTH QUESTIONNAIRE - PHQ9
SUM OF ALL RESPONSES TO PHQ9 QUESTIONS 1 AND 2: 0
1. LITTLE INTEREST OR PLEASURE IN DOING THINGS: NOT AT ALL
2. FEELING DOWN, DEPRESSED, IRRITABLE, OR HOPELESS: NOT AT ALL

## 2025-05-23 ASSESSMENT — SOCIAL DETERMINANTS OF HEALTH (SDOH)
WITHIN THE PAST 12 MONTHS, YOU WORRIED THAT YOUR FOOD WOULD RUN OUT BEFORE YOU GOT THE MONEY TO BUY MORE: SOMETIMES TRUE
WITHIN THE LAST YEAR, HAVE YOU BEEN HUMILIATED OR EMOTIONALLY ABUSED IN OTHER WAYS BY YOUR PARTNER OR EX-PARTNER?: NO
WITHIN THE PAST 12 MONTHS, THE FOOD YOU BOUGHT JUST DIDN'T LAST AND YOU DIDN'T HAVE MONEY TO GET MORE: SOMETIMES TRUE
WITHIN THE LAST YEAR, HAVE YOU BEEN AFRAID OF YOUR PARTNER OR EX-PARTNER?: NO
IN THE PAST 12 MONTHS, HAS THE ELECTRIC, GAS, OIL, OR WATER COMPANY THREATENED TO SHUT OFF SERVICE IN YOUR HOME?: NO
WITHIN THE LAST YEAR, HAVE TO BEEN RAPED OR FORCED TO HAVE ANY KIND OF SEXUAL ACTIVITY BY YOUR PARTNER OR EX-PARTNER?: NO
WITHIN THE LAST YEAR, HAVE YOU BEEN KICKED, HIT, SLAPPED, OR OTHERWISE PHYSICALLY HURT BY YOUR PARTNER OR EX-PARTNER?: NO

## 2025-05-23 NOTE — PROGRESS NOTES
2135- Pt arrived to labor and delivery.  Pt endorses +FM, denies LOC, and UC.    0700- Report given to JESSICA Muñoz, care relinquished.

## 2025-05-23 NOTE — CARE PLAN
The patient is Stable - Low risk of patient condition declining or worsening    Shift Goals  Clinical Goals: cervical change  Patient Goals: healthy mom healthy baby  Family Goals: support    Progress made toward(s) clinical / shift goals:    Problem: Knowledge Deficit - L&D  Goal: Patient and family/caregivers will demonstrate understanding of plan of care, disease process/condition, diagnostic tests and medications  Outcome: Progressing  POC discussed, questions answered     Problem: Pain  Goal: Patient's pain will be alleviated or reduced to the patient’s comfort goal  Outcome: Progressing  Pain interventions discussed

## 2025-05-23 NOTE — CARE PLAN
The patient is Stable - Low risk of patient condition declining or worsening    Shift Goals  Clinical Goals: Cervical dilation and effacement  Patient Goals: Deliver a healthy baby  Family Goals: Support    Progress made toward(s) clinical / shift goals:    Problem: Knowledge Deficit - L&D  Goal: Patient and family/caregivers will demonstrate understanding of plan of care, disease process/condition, diagnostic tests and medications  Description: Target End Date:  1-3 days or as soon as patient condition allows1.  Oriented to unit, equipment, visitation policy and means for communicating concern2.  Complete/review Learning Assessment3.  Assess patient's preparation, knowledge level and expectations4.  Provide accurate information in basic terms, clarify misconceptions5.  Explain disease process/condition, treatment plan, diagnostic tests and medications6.  Encourage patient and support person to ask questions and verbalize their understanding7.  Instruct patient/support person in basic interpretation of fetal monitor8.  Obtain informed consent when appropriate9.  Demonstrate breathing/relaxation techniques  Outcome: Progressing     Problem: Pain  Goal: Patient's pain will be alleviated or reduced to the patient’s comfort goal  Description: Target End Date:  Prior to discharge or change in level of care1.  Document pain using the appropriate pain scale per order or unit policy2.  Administer pain medications per provider order and/or assist with epidural/spinal placement as needed3.  Pain management medications as ordered4.  Educate and implement non-pharmacologic comfort measures (i.e. relaxation, distraction, massage, cold/heat therapy, etc.)5.  Assess for nonverbal signs of ineffective coping with pain and offer pain medication and/or epidural anesthesia  Outcome: Progressing  Note: Pt continuously monitored for pain, educated on pain management options.  Call light within reach, pt understands to call RN regarding  any concerns

## 2025-05-23 NOTE — PROGRESS NOTES
Patient seen and evaluated at bedside.  [id number] used for interpretation. Cervical exam was performed. She is closed, thick, high, and cervical exams are very difficult for her. She is kirstin about every 5 minutes, too much for a forth dose of cytotec. I reviewed her chart with her. Pregnancy is uncomplicated except for maternal age of 39. I discussed that this is technically an elective induction. I gave her the option of continuing or going home and returning in a week. I discussed that if she stay I anticipate a long induction that may lead to a . She voiced understanding and after discussing with her partner, they are ok discharging home. I gave her strict movement and labor precautions and sent a message to our clinic to get her scheduled for an NST in 3 days on Monday. She knows to return here to labor and delivery for decreased fetal movement, leaking, bleeding, or signs of labor. All questions answered. FHT: 140/mod/+accel/no decel. Patient discharged home.     Naldo Verdugo M.D.

## 2025-05-23 NOTE — H&P
Admission History and Physical      Lina Lazo is a 39 y.o. female  at 39w0d who presents for induction of labor due to AMA status    Subjective:   positive - irregular  For CTXS.   negative Feels pain   negative for LOF  negative for vaginal bleeding.   positive for fetal movement    ROS: Pertinent items are noted in HPI.    Past Medical History[1]  Past Surgical History[2]  OB History    Para Term  AB Living   1        SAB IAB Ectopic Molar Multiple Live Births              # Outcome Date GA Lbr Balbir/2nd Weight Sex Type Anes PTL Lv   1 Current              Social History     Socioeconomic History    Marital status: Single     Spouse name: Not on file    Number of children: Not on file    Years of education: Not on file    Highest education level: Not on file   Occupational History    Not on file   Tobacco Use    Smoking status: Never    Smokeless tobacco: Never   Vaping Use    Vaping status: Never Used   Substance and Sexual Activity    Alcohol use: Never    Drug use: Never    Sexual activity: Yes     Partners: Male     Birth control/protection: None   Other Topics Concern    Not on file   Social History Narrative    Not on file     Social Drivers of Health     Financial Resource Strain: Not on file   Food Insecurity: Not on file   Transportation Needs: Not on file   Physical Activity: Not on file   Stress: Not on file   Social Connections: Not on file   Intimate Partner Violence: Not on file   Housing Stability: Not on file     Allergies: Patient has no known allergies.  Current Medications[3]    Prenatal care with [facility] starting at 10 with following problems:  Patient Active Problem List    Diagnosis Date Noted    Prenatal care in third trimester 2025    Swelling of both hands 2025    Abnormal pregnancy US 2025    Advanced maternal age, primigravida, antepartum 2024       Last US at 28 weeks     Indication:Supervision of Normal First  "Pregnancy  Indication:Routine screening for abnormality  Fetal Growth Overview  ======================     Exam date     GA        BPD (mm)       HC (mm)          AC (mm)         FL (mm)        HL (mm)        EFW (g)  01/20/2025    21w 4d    49.4    24%    190.4     28%    179.5    81%    36.2    37%    34.3    53%    474     69%  03/10/2025    28w 4d    70.6    31%    257.6     8%     241.5    38%    54.4    40%    48    32%      1236     34%  Method  =======     Transabdominal ultrasound examination. View: Sufficient  Pregnancy  ==========     Cotto pregnancy. Number of fetuses: 1  Dating  =======     LMP on:8/22/2024  GA by [id number] w + 4 d  MARCELO by LMP:5/29/2025  GA by prior [id number] w + 4 d  MARCELO by prior assessment:5/29/2025  Ultrasound examination on:3/10/2025  GA by U/S based upon:AC, BPD, Femur, HC  GA by U/S28 w + 3 d  MARCELO by U/S:5/30/2025  Assigned:based on the LMP, selected on 10/31/2024  Assigned GA28 w + 4 d  Assigned MARCELO:5/29/2025        Objective:      /86   Pulse (!) 103   Temp 36.4 °C (97.6 °F) (Temporal)   Ht 1.6 m (5' 3\")   Wt 83 kg (183 lb)   SpO2 97%     General:   no acute distress, alert   Skin:   normal   HEENT:  PERRLA   Lungs:   CTA bilateral   Heart:   S1, S2 normal, no murmur, click, rub or gallop, regular rate and rhythm, peripheral pulses very brisk, chest is clear without rales or wheezing, no pedal edema   Abdomen:   gravid, NT   EFW:  3400g   Pelvis:  adequate, diagnonal conjugate not met   FHT:  150 BPM   Uterine Size: S=D   Presentations: Cephalic   Cervix:     Dilation: FT    Effacement: 60    Station:  -2    Consistency: Soft    Position: Posterior     Lab Review  Lab:   Blood type: A     Recent Results (from the past 35 weeks)   VAGINAL PATHOGENS DNA PANEL    Collection Time: 11/01/24  2:10 PM   Result Value Ref Range    Candida species DNA Probe Negative Negative    Trichomonas vaginalis DNA Probe Negative Negative    Gardnerella vaginalis DNA Probe Negative " Negative   THINPREP PAP W/HPV AND CTNG    Collection Time: 11/01/24  2:10 PM   Result Value Ref Range    ThinPrep Pap, Cytology SEE BELOW    Chlamydia/GC PCR Assoc.W/Thinprep    Collection Time: 11/01/24  2:10 PM   Result Value Ref Range    C. trachomatis by PCR Negative Negative    N. gonorrhoeae by PCR Negative Negative    Source Vaginal    HPV HIGH-RISK W/RFLX GENOTYPE    Collection Time: 11/01/24  2:10 PM   Result Value Ref Range    Source Cervical     HPV by PCR ThinPrep Not Detected    AFP TETRA    Collection Time: 12/12/24  8:33 AM   Result Value Ref Range    AFP Value -Eia 31 ng/mL    AFP MOM Value 0.97     Ue3 Value 1.03 ng/mL    Ue3 Mom 0.83     Patient's hCG, 2nd Trimester 03645 IU/L    hCG MoM, 2nd Trimester 1.20     Lauren Value -Eia 215 pg/mL    Lauren Mom Value 1.30     Interpretation Screen Neg     [age]ARCELO [age]    Maternal Weight 166.0 lbs.     Gest. Age on Collection Date 16 wks, 0 days     Gestational Age Based On Other     Multiple Pregnancy Cotto     Race Unknown     Insulin Dependent Diabetes No     Smoking No     Family Hx NTD No     Family Hx of Aneuploidy No     Specimen See Note     EER Quad, Maternal Serum See Note    GLUCOSE 1HR GESTATIONAL    Collection Time: 12/12/24  8:33 AM   Result Value Ref Range    Glucose, Post Dose 107 70 - 139 mg/dL   PREG CNTR PRENATAL PN    Collection Time: 12/12/24  8:34 AM   Result Value Ref Range    WBC 11.0 (H) 4.8 - 10.8 K/uL    RBC 4.03 (L) 4.20 - 5.40 M/uL    Hemoglobin 13.4 12.0 - 16.0 g/dL    Hematocrit 37.7 37.0 - 47.0 %    MCV 93.5 81.4 - 97.8 fL    MCH 33.3 (H) 27.0 - 33.0 pg    MCHC 35.5 32.2 - 35.5 g/dL    RDW 44.7 35.9 - 50.0 fL    Platelet Count 181 164 - 446 K/uL    MPV 10.9 9.0 - 12.9 fL    Hepatitis C Antibody Non-Reactive Non-Reactive    Hepatitis B Surface Antigen Non-Reactive Non-Reactive    Rubella IgG Antibody 130.00 IU/mL    Syphilis, Treponemal Qual Non-Reactive Non-Reactive   URINE DRUG SCREEN W/CONF (AR)    Collection  Time: 12/12/24  8:34 AM   Result Value Ref Range    Urine Amphetamine-Methamphetam Negative Cutoff 300 ng/mL    Barbiturates Negative Cutoff 200 ng/mL    Benzodiazepines Negative Cutoff 200 ng/mL    Propoxyphene Negative Cutoff 300 ng/mL    Cocaine Metabolite Negative Cutoff 150 ng/mL    Methadone Negative Cutoff 150 ng/mL    Codeine-Morphine Negative Cutoff 300 ng/mL    Phencyclidine -Pcp Negative Cutoff 25 ng/mL    Cannabinoid Metab Negative Cutoff 50 ng/mL    Creatinine Urine 163.9 20.0 - 400.0 mg/dL    Drug Comment Urine Drugs See Note    URINE CULTURE(NEW)    Collection Time: 12/12/24  8:34 AM    Specimen: Urine   Result Value Ref Range    Significant Indicator NEG     Source UR     Site Clean Catch     Culture Result Usual urogenital tervor ,000 cfu/mL    HIV AG/AB COMBO ASSAY SCREENING    Collection Time: 12/12/24  8:34 AM   Result Value Ref Range    HIV Ag/Ab Combo Assay Non-Reactive Non Reactive   OP Prenatal Panel-Blood Bank    Collection Time: 12/12/24  8:34 AM   Result Value Ref Range    ABO Grouping Only A     Rh Grouping Only POS     Antibody Screen Scrn NEG    T.PALLIDUM AB ST[place]ORD (SCREENING)    Collection Time: 02/24/25  7:47 AM   Result Value Ref Range    Syphilis, Treponemal Qual Non-Reactive Non-Reactive   GLUCOSE 1HR GESTATIONAL    Collection Time: 02/24/25  7:47 AM   Result Value Ref Range    Glucose, Post Dose 154 (H) 70 - 139 mg/dL   CBC WITHOUT DIFFERENTIAL    Collection Time: 02/24/25  7:47 AM   Result Value Ref Range    WBC 12.2 (H) 4.8 - 10.8 K/uL    RBC 3.85 (L) 4.20 - 5.40 M/uL    Hemoglobin 12.6 12.0 - 16.0 g/dL    Hematocrit 37.3 37.0 - 47.0 %    MCV 96.9 81.4 - 97.8 fL    MCH 32.7 27.0 - 33.0 pg    MCHC 33.8 32.2 - 35.5 g/dL    RDW 48.2 35.9 - 50.0 fL    Platelet Count 172 164 - 446 K/uL    MPV 11.7 9.0 - 12.9 fL   POCT Urinalysis    Collection Time: 03/06/25  9:15 AM   Result Value Ref Range    POC Color dark yellow Negative    POC Appearance clear Negative    POC Glucose negative  Negative mg/dL    POC Bilirubin negative Negative mg/dL    POC Ketones negative Negative mg/dL    POC Specific Gravity 1.030 <1.005 - >1.030    POC Blood negative Negative    POC Urine PH 6.0 5.0 - 8.0    POC Protein negative Negative mg/dL    POC Urobiligen 0.2 Negative (0.2) mg/dL    POC Nitrites negative Negative    POC Leukocyte Esterase negative Negative   GLUCOSE 3 HR GESTATIONAL    Collection Time: 03/07/25  7:58 AM   Result Value Ref Range    Baseline Glucose 85 65 - 95 mg/dL    Glucose 1 Hour 156 65 - 180 mg/dL    Glucose 2 Hour 134 65 - 155 mg/dL    Glucose 3 Hour 103 65 - 140 mg/dL   POCT Urinalysis    Collection Time: 04/18/25 10:10 AM   Result Value Ref Range    POC Color yellow Negative    POC Appearance clear Negative    POC Glucose neg Negative mg/dL    POC Bilirubin neg Negative mg/dL    POC Ketones neg Negative mg/dL    POC Specific Gravity 1.025 <1.005 - >1.030    POC Blood neg Negative    POC Urine PH 7.0 5.0 - 8.0    POC Protein 30 Negative mg/dL    POC Urobiligen 0.2 Negative (0.2) mg/dL    POC Nitrites neg Negative    POC Leukocyte Esterase neg Negative   GRP B STREP, BY PCR (MORENO BROTH)    Collection Time: 05/02/25  2:59 PM    Specimen: Genital   Result Value Ref Range    Strep Gp B DNA PCR Negative Negative          Assessment:   Lina Lazo at 39w0d  Labor status: Not in labor.  Obstetrical history significant for   Patient Active Problem List    Diagnosis Date Noted    Prenatal care in third trimester 04/04/2025    Swelling of both hands 02/05/2025    Abnormal pregnancy US 01/24/2025    Advanced maternal age, primigravida, antepartum 11/01/2024   .      Plan:     1. Admit to L&D  2. GBS negative   3. Unsure of desires for pain relief. Discussed options with patient.    4. Plan at this time is cytotec . Consider pitocin for augmentation if appropriate.       CFtomás VILLAVICENCIO/ Dr. Granado Attending         [1]   Past Medical History:  Diagnosis Date    Allergy    [2]   Past  Surgical History:  Procedure Laterality Date    LIPOSUCTION      2016   [3] No current facility-administered medications for this encounter.

## 2025-05-23 NOTE — PROGRESS NOTES
0700: Report received from Kristyn LOPEZ, care assumed.    1420: Dr Verdugo discussed options with patient and FOB via interpretor. Pt agreed to be discharged home and come back next week for IOL. Risks and term labor precautions discussed with pt by provider. All pt questions answered. Discharge orders received by MD.    1500: Discharge instructions printed in [place]. Discharge instructions discussed with interpretor including labor precautions (LOF, bleeding, ctx), kick counts & decreased FM discussed. Information on IOL given appointment as well as what to expect. Pt to follow up in office for NST per Dr Verdugo. Pt expresses understanding. All pt questions answered. Pt ambulated off unit in stable condition with FOB at side. Care relinquished.

## 2025-05-23 NOTE — DISCHARGE INSTRUCTIONS
Labor Instructions (37 - 39 weeks):  Call your physician or return to hospital if:  You have regular contractions that get progressively closer, longer and stronger.  Your water breaks (remember time and color).  You have bleeding like a period.  Your baby does not move enough to complete the daily kick counts (10 movements in 2 hours)  Your baby moves much less often than on the days before or you have not felt your baby move all day.  Labor Instructions (37 - 39 weeks):  Call your physician or return to hospital if:  You have regular contractions that get progressively closer, longer and stronger.  Your water breaks (remember time and color).  You have bleeding like a period.  Your baby does not move enough to complete the daily kick counts (10 movements in 2 hours)  Your baby moves much less often than on the days before or you have not felt your baby move all day.

## 2025-05-27 ENCOUNTER — HOSPITAL ENCOUNTER (INPATIENT)
Facility: MEDICAL CENTER | Age: 40
End: 2025-05-27
Attending: STUDENT IN AN ORGANIZED HEALTH CARE EDUCATION/TRAINING PROGRAM | Admitting: STUDENT IN AN ORGANIZED HEALTH CARE EDUCATION/TRAINING PROGRAM
Payer: MEDICAID

## 2025-05-27 ENCOUNTER — OFFICE VISIT (OUTPATIENT)
Dept: OBGYN | Facility: CLINIC | Age: 40
End: 2025-05-27

## 2025-05-27 VITALS
DIASTOLIC BLOOD PRESSURE: 84 MMHG | BODY MASS INDEX: 34.26 KG/M2 | WEIGHT: 193.4 LBS | SYSTOLIC BLOOD PRESSURE: 149 MMHG | HEART RATE: 87 BPM

## 2025-05-27 DIAGNOSIS — Z98.891 STATUS POST CESAREAN DELIVERY: Primary | ICD-10-CM

## 2025-05-27 DIAGNOSIS — O09.519 ADVANCED MATERNAL AGE, PRIMIGRAVIDA, ANTEPARTUM: Primary | ICD-10-CM

## 2025-05-27 DIAGNOSIS — O28.3 ABNORMAL PREGNANCY US: ICD-10-CM

## 2025-05-27 PROBLEM — Z34.90 ENCOUNTER FOR INDUCTION OF LABOR: Status: ACTIVE | Noted: 2025-05-27

## 2025-05-27 LAB
ALBUMIN SERPL BCP-MCNC: 3 G/DL (ref 3.2–4.9)
ALBUMIN/GLOB SERPL: 1 G/DL
ALP SERPL-CCNC: 200 U/L (ref 30–99)
ALT SERPL-CCNC: 38 U/L (ref 2–50)
ANION GAP SERPL CALC-SCNC: 11 MMOL/L (ref 7–16)
AST SERPL-CCNC: 35 U/L (ref 12–45)
BASOPHILS # BLD AUTO: 0.6 % (ref 0–1.8)
BASOPHILS # BLD: 0.06 K/UL (ref 0–0.12)
BILIRUB SERPL-MCNC: 0.2 MG/DL (ref 0.1–1.5)
BUN SERPL-MCNC: 18 MG/DL (ref 8–22)
CALCIUM ALBUM COR SERPL-MCNC: 10.1 MG/DL (ref 8.5–10.5)
CALCIUM SERPL-MCNC: 9.3 MG/DL (ref 8.5–10.5)
CHLORIDE SERPL-SCNC: 108 MMOL/L (ref 96–112)
CO2 SERPL-SCNC: 16 MMOL/L (ref 20–33)
CREAT SERPL-MCNC: 0.8 MG/DL (ref 0.5–1.4)
CREAT UR-MCNC: 47 MG/DL
EOSINOPHIL # BLD AUTO: 0.09 K/UL (ref 0–0.51)
EOSINOPHIL NFR BLD: 0.9 % (ref 0–6.9)
ERYTHROCYTE [DISTWIDTH] IN BLOOD BY AUTOMATED COUNT: 45.8 FL (ref 35.9–50)
GFR SERPLBLD CREATININE-BSD FMLA CKD-EPI: 97 ML/MIN/1.73 M 2
GLOBULIN SER CALC-MCNC: 3 G/DL (ref 1.9–3.5)
GLUCOSE SERPL-MCNC: 82 MG/DL (ref 65–99)
HCT VFR BLD AUTO: 36.3 % (ref 37–47)
HGB BLD-MCNC: 12.4 G/DL (ref 12–16)
HOLDING TUBE BB 8507: NORMAL
IMM GRANULOCYTES # BLD AUTO: 0.19 K/UL (ref 0–0.11)
IMM GRANULOCYTES NFR BLD AUTO: 2 % (ref 0–0.9)
LYMPHOCYTES # BLD AUTO: 2.11 K/UL (ref 1–4.8)
LYMPHOCYTES NFR BLD: 22 % (ref 22–41)
MCH RBC QN AUTO: 32.6 PG (ref 27–33)
MCHC RBC AUTO-ENTMCNC: 34.2 G/DL (ref 32.2–35.5)
MCV RBC AUTO: 95.5 FL (ref 81.4–97.8)
MONOCYTES # BLD AUTO: 0.68 K/UL (ref 0–0.85)
MONOCYTES NFR BLD AUTO: 7.1 % (ref 0–13.4)
NEUTROPHILS # BLD AUTO: 6.47 K/UL (ref 1.82–7.42)
NEUTROPHILS NFR BLD: 67.4 % (ref 44–72)
NRBC # BLD AUTO: 0 K/UL
NRBC BLD-RTO: 0 /100 WBC (ref 0–0.2)
PLATELET # BLD AUTO: 117 K/UL (ref 164–446)
PMV BLD AUTO: 12.8 FL (ref 9–12.9)
POTASSIUM SERPL-SCNC: 4.1 MMOL/L (ref 3.6–5.5)
PROT SERPL-MCNC: 6 G/DL (ref 6–8.2)
PROT UR-MCNC: 76.6 MG/DL (ref 0–15)
PROT/CREAT UR: 1630 MG/G (ref 10–107)
RBC # BLD AUTO: 3.8 M/UL (ref 4.2–5.4)
SODIUM SERPL-SCNC: 135 MMOL/L (ref 135–145)
T PALLIDUM AB SER QL IA: NORMAL
WBC # BLD AUTO: 9.6 K/UL (ref 4.8–10.8)

## 2025-05-27 PROCEDURE — 36415 COLL VENOUS BLD VENIPUNCTURE: CPT

## 2025-05-27 PROCEDURE — 82570 ASSAY OF URINE CREATININE: CPT

## 2025-05-27 PROCEDURE — 86780 TREPONEMA PALLIDUM: CPT

## 2025-05-27 PROCEDURE — 85025 COMPLETE CBC W/AUTO DIFF WBC: CPT

## 2025-05-27 PROCEDURE — 84156 ASSAY OF PROTEIN URINE: CPT

## 2025-05-27 PROCEDURE — 700102 HCHG RX REV CODE 250 W/ 637 OVERRIDE(OP): Performed by: STUDENT IN AN ORGANIZED HEALTH CARE EDUCATION/TRAINING PROGRAM

## 2025-05-27 PROCEDURE — A9270 NON-COVERED ITEM OR SERVICE: HCPCS | Performed by: STUDENT IN AN ORGANIZED HEALTH CARE EDUCATION/TRAINING PROGRAM

## 2025-05-27 PROCEDURE — 770002 HCHG ROOM/CARE - OB PRIVATE (112)

## 2025-05-27 PROCEDURE — 80053 COMPREHEN METABOLIC PANEL: CPT

## 2025-05-27 RX ORDER — OXYTOCIN 10 [USP'U]/ML
10 INJECTION, SOLUTION INTRAMUSCULAR; INTRAVENOUS
Status: DISCONTINUED | OUTPATIENT
Start: 2025-05-27 | End: 2025-05-29

## 2025-05-27 RX ORDER — TERBUTALINE SULFATE 1 MG/ML
0.25 INJECTION SUBCUTANEOUS
Status: DISCONTINUED | OUTPATIENT
Start: 2025-05-27 | End: 2025-05-29

## 2025-05-27 RX ORDER — ACETAMINOPHEN 500 MG
1000 TABLET ORAL EVERY 6 HOURS PRN
Status: DISCONTINUED | OUTPATIENT
Start: 2025-05-27 | End: 2025-05-27

## 2025-05-27 RX ORDER — IBUPROFEN 800 MG/1
800 TABLET, FILM COATED ORAL
Status: DISCONTINUED | OUTPATIENT
Start: 2025-05-27 | End: 2025-05-29

## 2025-05-27 RX ORDER — LIDOCAINE HYDROCHLORIDE 10 MG/ML
20 INJECTION, SOLUTION INFILTRATION; PERINEURAL
Status: DISCONTINUED | OUTPATIENT
Start: 2025-05-27 | End: 2025-05-29

## 2025-05-27 RX ORDER — SODIUM CHLORIDE, SODIUM LACTATE, POTASSIUM CHLORIDE, CALCIUM CHLORIDE 600; 310; 30; 20 MG/100ML; MG/100ML; MG/100ML; MG/100ML
INJECTION, SOLUTION INTRAVENOUS CONTINUOUS
Status: DISCONTINUED | OUTPATIENT
Start: 2025-05-27 | End: 2025-05-28

## 2025-05-27 RX ORDER — ACETAMINOPHEN 500 MG
1000 TABLET ORAL
Status: DISCONTINUED | OUTPATIENT
Start: 2025-05-27 | End: 2025-05-27

## 2025-05-27 RX ORDER — ACETAMINOPHEN 500 MG
1000 TABLET ORAL EVERY 6 HOURS PRN
Status: DISCONTINUED | OUTPATIENT
Start: 2025-05-27 | End: 2025-05-29

## 2025-05-27 RX ORDER — METOCLOPRAMIDE 10 MG/1
10 TABLET ORAL EVERY 6 HOURS PRN
Status: DISCONTINUED | OUTPATIENT
Start: 2025-05-27 | End: 2025-05-29

## 2025-05-27 RX ADMIN — DINOPROSTONE 10 MG: 10 INSERT VAGINAL at 15:43

## 2025-05-27 ASSESSMENT — PATIENT HEALTH QUESTIONNAIRE - PHQ9
1. LITTLE INTEREST OR PLEASURE IN DOING THINGS: NOT AT ALL
SUM OF ALL RESPONSES TO PHQ9 QUESTIONS 1 AND 2: 0
2. FEELING DOWN, DEPRESSED, IRRITABLE, OR HOPELESS: NOT AT ALL

## 2025-05-27 ASSESSMENT — PAIN DESCRIPTION - PAIN TYPE
TYPE: ACUTE PAIN

## 2025-05-27 ASSESSMENT — SOCIAL DETERMINANTS OF HEALTH (SDOH)
WITHIN THE PAST 12 MONTHS, YOU WORRIED THAT YOUR FOOD WOULD RUN OUT BEFORE YOU GOT THE MONEY TO BUY MORE: SOMETIMES TRUE
WITHIN THE LAST YEAR, HAVE TO BEEN RAPED OR FORCED TO HAVE ANY KIND OF SEXUAL ACTIVITY BY YOUR PARTNER OR EX-PARTNER?: NO
WITHIN THE LAST YEAR, HAVE YOU BEEN AFRAID OF YOUR PARTNER OR EX-PARTNER?: NO
WITHIN THE LAST YEAR, HAVE YOU BEEN KICKED, HIT, SLAPPED, OR OTHERWISE PHYSICALLY HURT BY YOUR PARTNER OR EX-PARTNER?: NO
WITHIN THE PAST 12 MONTHS, THE FOOD YOU BOUGHT JUST DIDN'T LAST AND YOU DIDN'T HAVE MONEY TO GET MORE: SOMETIMES TRUE
IN THE PAST 12 MONTHS, HAS THE ELECTRIC, GAS, OIL, OR WATER COMPANY THREATENED TO SHUT OFF SERVICE IN YOUR HOME?: NO
WITHIN THE LAST YEAR, HAVE YOU BEEN HUMILIATED OR EMOTIONALLY ABUSED IN OTHER WAYS BY YOUR PARTNER OR EX-PARTNER?: NO

## 2025-05-27 ASSESSMENT — PAIN SCALES - GENERAL: PAINLEVEL: 0 - NO PAIN

## 2025-05-27 ASSESSMENT — LIFESTYLE VARIABLES: ALCOHOL_USE: NO

## 2025-05-27 NOTE — PROGRESS NOTES
Lina Lazo   Gestational age: 39w5d     Indication: AMA    NST Interpretation:  Baseline 130/mod/+accel/no decel  Reactive    It was noted that her BP's were in the 140's over 90's multiple times during the NST. The highest was 149/88. Suspect new diagnosis of gestational hypertension. She was sent to labor and delivery for IOL. The on call doctor and the charge nurse were notified.     Naldo Verdugo M.D.

## 2025-05-27 NOTE — PROGRESS NOTES
Pt here for NST-only AMA  # [phone]   Pt was seen at [facility] on 5/23/2025 for contractions.   Pt states has been having swelling of hands and feet, also having headaches.   Pt is scheduled for an In-Patient IOL on 5/29/25 at 8:00 pm, pt aware.

## 2025-05-27 NOTE — H&P
[facility]  History and Physical      Lina Lazo is a 39 y.o. female  at 39w5d by LMP who presents for IOL for AMA and for elevated BP in clinic today.    CC: No chief complaint on file.      Subjective:   Patient states she is feeling well today and has no acute complaints.  She was sent from clinic for IOL for elevated BP in the 140s.  She does report some mild generalized swelling in her hands and feet.  Has chronic migraines that she feels have been worsening the past few days.    Uterine contractions: no  Leakage of fluid: no  vaginal bleeding: no  fetal movement: yes      ROS:  GEN: denies fever, chills  HEENT: denies headache, denies blurry vision  CV: denies chest pain or palpitations, reports BLE edema  RESP: denies chest pain or shortness of breath  ABD: denies RUQ pain  : denies dysuria    Prenatal care with [facility] following problems:  Patient Active Problem List    Diagnosis Date Noted    Encounter for induction of labor 2025    Labor and delivery indication for care or intervention 2025    Prenatal care in third trimester 2025    Swelling of both hands 2025    Abnormal pregnancy US 2025    Advanced maternal age, primigravida, antepartum 2024       Past Medical History[1]  Past Surgical History[2]  Family History   Problem Relation Age of Onset    No Known Problems Mother     Cancer Father     Diabetes Father     No Known Problems Maternal Grandmother     No Known Problems Maternal Grandfather     No Known Problems Paternal Grandmother     No Known Problems Paternal Grandfather      OB History    Para Term  AB Living   1        SAB IAB Ectopic Molar Multiple Live Births              # Outcome Date GA Lbr Balbir/2nd Weight Sex Type Anes PTL Lv   1 Current              Social History     Socioeconomic History    Marital status: Single     Spouse name: Not on file    Number of children: Not on file    Years of education: Not  on file    Highest education level: Not on file   Occupational History    Not on file   Tobacco Use    Smoking status: Never    Smokeless tobacco: Never   Vaping Use    Vaping status: Never Used   Substance and Sexual Activity    Alcohol use: Never    Drug use: Never    Sexual activity: Yes     Partners: Male     Birth control/protection: None   Other Topics Concern    Not on file   Social History Narrative    Not on file     Social Drivers of Health     Financial Resource Strain: Not on file   Food Insecurity: Food Insecurity Present (5/23/2025)    Hunger Vital Sign     Worried About Running Out of Food in the Last Year: Sometimes true     Ran Out of Food in the Last Year: Sometimes true   Transportation Needs: Unmet Transportation Needs (5/23/2025)    PRAPARE - Transportation     Lack of Transportation (Medical): Yes     Lack of Transportation (Non-Medical): Yes   Physical Activity: Not on file   Stress: Not on file   Social Connections: Not on file   Intimate Partner Violence: Not At Risk (5/23/2025)    Humiliation, Afraid, Rape, and Kick questionnaire     Fear of Current or Ex-Partner: No     Emotionally Abused: No     Physically Abused: No     Sexually Abused: No   Housing Stability: High Risk (5/23/2025)    Housing Stability Vital Sign     Unable to Pay for Housing in the Last Year: Yes     Number of Times Moved in the Last Year: 0     Homeless in the Last Year: No     Allergies[3]   Current Medications[4]    Objective:      Vitals:    05/27/25 1326   BP:    Pulse: 77   Resp:    Temp:    SpO2: 96%        GEN: NAD, AAOx3, calm, cooperative, laying comfortably in bed  HEENT: NCAT, EOMI  CV: +S1S2, RRR, 2+ BLE edema, radial pulses 2+  RESP: CTAB, no cough, breathing comfortably on RA  ABS: soft, NTTP, gravid  : no lesions  MSK: moving all extremities    SVE: closed/high/thick    FHT: Cat 1, baseline 130, variability mod, +accels, -decels, +UC q12min    BSUS: cephalic  EFW: 34%ile @ 28w US  Fetal presentation:  cephalic  Placenta: anterior    Lab Review  Lab:   Blood type: A   Recent Labs     12/12/24  0834 02/24/25  0747 03/07/25  0758 05/22/25  2210 05/27/25  1345   ABOGROUP A  --   --   --   --    ABSCRN NEG  --   --   --   --    HEMOGLOBIN 13.4   < >  --    < > 12.4   PLATELETCT 181   < >  --    < > 117*   [id number]  --   --  103  --   --    RUBELLAIGG 130.00  --   --   --   --    HEPBSAG Non-Reactive  --   --   --   --    HEPCAB Non-Reactive  --   --   --   --     < > = values in this interval not displayed.      Recent Results (from the past 35 weeks)   VAGINAL PATHOGENS DNA PANEL    Collection Time: 11/01/24  2:10 PM   Result Value Ref Range    Candida species DNA Probe Negative Negative    Trichomonas vaginalis DNA Probe Negative Negative    Gardnerella vaginalis DNA Probe Negative Negative   THINPREP PAP W/HPV AND CTNG    Collection Time: 11/01/24  2:10 PM   Result Value Ref Range    ThinPrep Pap, Cytology SEE BELOW    Chlamydia/GC PCR Assoc.W/Thinprep    Collection Time: 11/01/24  2:10 PM   Result Value Ref Range    C. trachomatis by PCR Negative Negative    N. gonorrhoeae by PCR Negative Negative    Source Vaginal    HPV HIGH-RISK W/RFLX GENOTYPE    Collection Time: 11/01/24  2:10 PM   Result Value Ref Range    Source Cervical     HPV by PCR ThinPrep Not Detected    AFP TETRA    Collection Time: 12/12/24  8:33 AM   Result Value Ref Range    AFP Value -Eia 31 ng/mL    AFP MOM Value 0.97     Ue3 Value 1.03 ng/mL    Ue3 Mom 0.83     Patient's hCG, 2nd Trimester 10482 IU/L    hCG MoM, 2nd Trimester 1.20     Lauren Value -Eia 215 pg/mL    Lauren Mom Value 1.30     Interpretation Screen Neg     [age]ELO [age]    Maternal Weight 166.0 lbs.     Gest. Age on Collection Date 16 wks, 0 days     Gestational Age Based On Other     Multiple Pregnancy Cotto     Race Unknown     Insulin Dependent Diabetes No     Smoking No     Family Hx NTD No     Family Hx of Aneuploidy No     Specimen See Note     EER Quad,  Maternal Serum See Note    GLUCOSE 1HR GESTATIONAL    Collection Time: 12/12/24  8:33 AM   Result Value Ref Range    Glucose, Post Dose 107 70 - 139 mg/dL   PREG CNTR PRENATAL PN    Collection Time: 12/12/24  8:34 AM   Result Value Ref Range    WBC 11.0 (H) 4.8 - 10.8 K/uL    RBC 4.03 (L) 4.20 - 5.40 M/uL    Hemoglobin 13.4 12.0 - 16.0 g/dL    Hematocrit 37.7 37.0 - 47.0 %    MCV 93.5 81.4 - 97.8 fL    MCH 33.3 (H) 27.0 - 33.0 pg    MCHC 35.5 32.2 - 35.5 g/dL    RDW 44.7 35.9 - 50.0 fL    Platelet Count 181 164 - 446 K/uL    MPV 10.9 9.0 - 12.9 fL    Hepatitis C Antibody Non-Reactive Non-Reactive    Hepatitis B Surface Antigen Non-Reactive Non-Reactive    Rubella IgG Antibody 130.00 IU/mL    Syphilis, Treponemal Qual Non-Reactive Non-Reactive   URINE DRUG SCREEN W/CONF (AR)    Collection Time: 12/12/24  8:34 AM   Result Value Ref Range    Urine Amphetamine-Methamphetam Negative Cutoff 300 ng/mL    Barbiturates Negative Cutoff 200 ng/mL    Benzodiazepines Negative Cutoff 200 ng/mL    Propoxyphene Negative Cutoff 300 ng/mL    Cocaine Metabolite Negative Cutoff 150 ng/mL    Methadone Negative Cutoff 150 ng/mL    Codeine-Morphine Negative Cutoff 300 ng/mL    Phencyclidine -Pcp Negative Cutoff 25 ng/mL    Cannabinoid Metab Negative Cutoff 50 ng/mL    Creatinine Urine 163.9 20.0 - 400.0 mg/dL    Drug Comment Urine Drugs See Note    URINE CULTURE(NEW)    Collection Time: 12/12/24  8:34 AM    Specimen: Urine   Result Value Ref Range    Significant Indicator NEG     Source UR     Site Clean Catch     Culture Result Usual urogenital trevor ,000 cfu/mL    HIV AG/AB COMBO ASSAY SCREENING    Collection Time: 12/12/24  8:34 AM   Result Value Ref Range    HIV Ag/Ab Combo Assay Non-Reactive Non Reactive   OP Prenatal Panel-Blood Bank    Collection Time: 12/12/24  8:34 AM   Result Value Ref Range    ABO Grouping Only A     Rh Grouping Only POS     Antibody Screen Scrn NEG    T.PALLIDUM AB STANFORD (SCREENING)    Collection Time:  02/24/25  7:47 AM   Result Value Ref Range    Syphilis, Treponemal Qual Non-Reactive Non-Reactive   GLUCOSE 1HR GESTATIONAL    Collection Time: 02/24/25  7:47 AM   Result Value Ref Range    Glucose, Post Dose 154 (H) 70 - 139 mg/dL   CBC WITHOUT DIFFERENTIAL    Collection Time: 02/24/25  7:47 AM   Result Value Ref Range    WBC 12.2 (H) 4.8 - 10.8 K/uL    RBC 3.85 (L) 4.20 - 5.40 M/uL    Hemoglobin 12.6 12.0 - 16.0 g/dL    Hematocrit 37.3 37.0 - 47.0 %    MCV 96.9 81.4 - 97.8 fL    MCH 32.7 27.0 - 33.0 pg    MCHC 33.8 32.2 - 35.5 g/dL    RDW 48.2 35.9 - 50.0 fL    Platelet Count 172 164 - 446 K/uL    MPV 11.7 9.0 - 12.9 fL   POCT Urinalysis    Collection Time: 03/06/25  9:15 AM   Result Value Ref Range    POC Color dark yellow Negative    POC Appearance clear Negative    POC Glucose negative Negative mg/dL    POC Bilirubin negative Negative mg/dL    POC Ketones negative Negative mg/dL    POC Specific Gravity 1.030 <1.005 - >1.030    POC Blood negative Negative    POC Urine PH 6.0 5.0 - 8.0    POC Protein negative Negative mg/dL    POC Urobiligen 0.2 Negative (0.2) mg/dL    POC Nitrites negative Negative    POC Leukocyte Esterase negative Negative   GLUCOSE 3 HR GESTATIONAL    Collection Time: 03/07/25  7:58 AM   Result Value Ref Range    Baseline Glucose 85 65 - 95 mg/dL    Glucose 1 Hour 156 65 - 180 mg/dL    Glucose 2 Hour 134 65 - 155 mg/dL    Glucose 3 Hour 103 65 - 140 mg/dL   POCT Urinalysis    Collection Time: 04/18/25 10:10 AM   Result Value Ref Range    POC Color yellow Negative    POC Appearance clear Negative    POC Glucose neg Negative mg/dL    POC Bilirubin neg Negative mg/dL    POC Ketones neg Negative mg/dL    POC Specific Gravity 1.025 <1.005 - >1.030    POC Blood neg Negative    POC Urine PH 7.0 5.0 - 8.0    POC Protein 30 Negative mg/dL    POC Urobiligen 0.2 Negative (0.2) mg/dL    POC Nitrites neg Negative    POC Leukocyte Esterase neg Negative   GRP B STREP, BY PCR (MORENO BROTH)    Collection Time:  05/02/25  2:59 PM    Specimen: Genital   Result Value Ref Range    Strep Gp B DNA PCR Negative Negative   CBC with differential    Collection Time: 05/22/25 10:10 PM   Result Value Ref Range    WBC 10.9 (H) 4.8 - 10.8 K/uL    RBC 3.66 (L) 4.20 - 5.40 M/uL    Hemoglobin 12.0 12.0 - 16.0 g/dL    Hematocrit 34.7 (L) 37.0 - 47.0 %    MCV 94.8 81.4 - 97.8 fL    MCH 32.8 27.0 - 33.0 pg    MCHC 34.6 32.2 - 35.5 g/dL    RDW 45.7 35.9 - 50.0 fL    Platelet Count 116 (L) 164 - 446 K/uL    MPV 12.2 9.0 - 12.9 fL    Neutrophils-Polys 67.00 44.00 - 72.00 %    Lymphocytes 22.40 22.00 - 41.00 %    Monocytes 7.60 0.00 - 13.40 %    Eosinophils 1.00 0.00 - 6.90 %    Basophils 0.40 0.00 - 1.80 %    Immature Granulocytes 1.60 (H) 0.00 - 0.90 %    Nucleated RBC 0.20 0.00 - 0.20 /100 WBC    Neutrophils (Absolute) 7.34 1.82 - 7.42 K/uL    Lymphs (Absolute) 2.45 1.00 - 4.80 K/uL    Monos (Absolute) 0.83 0.00 - 0.85 K/uL    Eos (Absolute) 0.11 0.00 - 0.51 K/uL    Baso (Absolute) 0.04 0.00 - 0.12 K/uL    Immature Granulocytes (abs) 0.17 (H) 0.00 - 0.11 K/uL    NRBC (Absolute) 0.02 K/uL   T.PALLIDUM AB STANFORD (Syphilis)    Collection Time: 05/22/25 10:10 PM   Result Value Ref Range    Syphilis, Treponemal Qual Non-Reactive Non-Reactive   HOLD BLOOD BANK SPECIMEN (NOT TESTED)    Collection Time: 05/22/25 10:10 PM   Result Value Ref Range    Holding Tube - Bb DONE    CBC with differential    Collection Time: 05/27/25  1:45 PM   Result Value Ref Range    WBC 9.6 4.8 - 10.8 K/uL    RBC 3.80 (L) 4.20 - 5.40 M/uL    Hemoglobin 12.4 12.0 - 16.0 g/dL    Hematocrit 36.3 (L) 37.0 - 47.0 %    MCV 95.5 81.4 - 97.8 fL    MCH 32.6 27.0 - 33.0 pg    MCHC 34.2 32.2 - 35.5 g/dL    RDW 45.8 35.9 - 50.0 fL    Platelet Count 117 (L) 164 - 446 K/uL    MPV 12.8 9.0 - 12.9 fL    Neutrophils-Polys 67.40 44.00 - 72.00 %    Lymphocytes 22.00 22.00 - 41.00 %    Monocytes 7.10 0.00 - 13.40 %    Eosinophils 0.90 0.00 - 6.90 %    Basophils 0.60 0.00 - 1.80 %    Immature  Granulocytes 2.00 (H) 0.00 - 0.90 %    Nucleated RBC 0.00 0.00 - 0.20 /100 WBC    Neutrophils (Absolute) 6.47 1.82 - 7.42 K/uL    Lymphs (Absolute) 2.11 1.00 - 4.80 K/uL    Monos (Absolute) 0.68 0.00 - 0.85 K/uL    Eos (Absolute) 0.09 0.00 - 0.51 K/uL    Baso (Absolute) 0.06 0.00 - 0.12 K/uL    Immature Granulocytes (abs) 0.19 (H) 0.00 - 0.11 K/uL    NRBC (Absolute) 0.00 K/uL   HOLD BLOOD BANK SPECIMEN (NOT TESTED)    Collection Time: 25  1:45 PM   Result Value Ref Range    Holding Tube - Bb DONE            Assessment and Plan:     Lina Lazo is a 39 y.o. female  at 39w5d by LMP who presents for IOL for AMA and for elevated BP in clinic today.    #SIUP at 39w5d by LMP  - prenatal care with [facility]  - Labor status: not in labor  - BSUS: cephalic    #fetal status, Cat 1  - reassuring  - continuous fetal monitoring    #labor induction/augmentation  - Due to unfavorable cervix, we will use cervidil  - pitocin titrated to adequate contractions  - clear liquid diet  - pt can epidural when she desires  - mIVF as indicated     #gHTN  -PIH labs pending    #GBS neg    #rubella: immune    #HIV neg, Trep neg, HBsAg neg, Hep C neg, GCCT neg/neg    #blood type A pos    #contraception: desires BTL      Disposition: Admit to Labor    Moises Nazario M.D. , PGY1         [1]   Past Medical History:  Diagnosis Date    Allergy    [2]   Past Surgical History:  Procedure Laterality Date    LIPOSUCTION         [3] No Known Allergies  [4]   Current Facility-Administered Medications:     LR infusion, , Intravenous, Continuous, Eloina Baig M.D.    lidocaine (XYLOCAINE) 1%  injection, 20 mL, Subcutaneous, Once PRN, Eloina Baig M.D.    terbutaline (Brethine) injection 0.25 mg, 0.25 mg, Subcutaneous, Once PRN, Eloina Baig M.D.    oxytocin (Pitocin) infusion bolus (for post delivery), 20 Units, Intravenous, Once **FOLLOWED BY** oxytocin (Pitocin) infusion (for post delivery), 125 mL/hr,  Intravenous, Continuous, Eloina Baig M.D.    oxytocin (Pitocin) injection 10 Units, 10 Units, Intramuscular, Once PRN, Eloina Baig M.D.    ibuprofen (Motrin) tablet 800 mg, 800 mg, Oral, Once PRN, Eloina Baig M.D.    metoclopramide (Reglan) tablet 10 mg, 10 mg, Oral, Q6HRS PRN, Eloina Baig M.D.    acetaminophen (Tylenol) tablet 1,000 mg, 1,000 mg, Oral, Q6HRS PRN, Eloina Baig M.D.    dinoprostone (Cervidil) vaginal insert 10 mg, 10 mg, Vaginal, Once, Eloina Baig M.D.

## 2025-05-28 ENCOUNTER — ANESTHESIA EVENT (OUTPATIENT)
Dept: OBGYN | Facility: MEDICAL CENTER | Age: 40
End: 2025-05-28
Payer: MEDICAID

## 2025-05-28 ENCOUNTER — ANESTHESIA (OUTPATIENT)
Dept: OBGYN | Facility: MEDICAL CENTER | Age: 40
End: 2025-05-28
Payer: MEDICAID

## 2025-05-28 LAB
BASOPHILS # BLD AUTO: 0.4 % (ref 0–1.8)
BASOPHILS # BLD: 0.04 K/UL (ref 0–0.12)
EOSINOPHIL # BLD AUTO: 0.15 K/UL (ref 0–0.51)
EOSINOPHIL NFR BLD: 1.5 % (ref 0–6.9)
ERYTHROCYTE [DISTWIDTH] IN BLOOD BY AUTOMATED COUNT: 45.1 FL (ref 35.9–50)
HCT VFR BLD AUTO: 37.2 % (ref 37–47)
HGB BLD-MCNC: 12.6 G/DL (ref 12–16)
IMM GRANULOCYTES # BLD AUTO: 0.19 K/UL (ref 0–0.11)
IMM GRANULOCYTES NFR BLD AUTO: 1.9 % (ref 0–0.9)
LYMPHOCYTES # BLD AUTO: 2.58 K/UL (ref 1–4.8)
LYMPHOCYTES NFR BLD: 25.6 % (ref 22–41)
MCH RBC QN AUTO: 32 PG (ref 27–33)
MCHC RBC AUTO-ENTMCNC: 33.9 G/DL (ref 32.2–35.5)
MCV RBC AUTO: 94.4 FL (ref 81.4–97.8)
MONOCYTES # BLD AUTO: 0.62 K/UL (ref 0–0.85)
MONOCYTES NFR BLD AUTO: 6.2 % (ref 0–13.4)
NEUTROPHILS # BLD AUTO: 6.5 K/UL (ref 1.82–7.42)
NEUTROPHILS NFR BLD: 64.4 % (ref 44–72)
NRBC # BLD AUTO: 0.02 K/UL
NRBC BLD-RTO: 0.2 /100 WBC (ref 0–0.2)
PLATELET # BLD AUTO: 121 K/UL (ref 164–446)
PMV BLD AUTO: 12.6 FL (ref 9–12.9)
RBC # BLD AUTO: 3.94 M/UL (ref 4.2–5.4)
WBC # BLD AUTO: 10.1 K/UL (ref 4.8–10.8)

## 2025-05-28 PROCEDURE — 700105 HCHG RX REV CODE 258: Performed by: NURSE PRACTITIONER

## 2025-05-28 PROCEDURE — 700105 HCHG RX REV CODE 258: Performed by: PHYSICIAN ASSISTANT

## 2025-05-28 PROCEDURE — 303615 HCHG EPIDURAL/SPINAL ANESTHESIA FOR LABOR

## 2025-05-28 PROCEDURE — 85025 COMPLETE CBC W/AUTO DIFF WBC: CPT

## 2025-05-28 PROCEDURE — 700105 HCHG RX REV CODE 258: Performed by: STUDENT IN AN ORGANIZED HEALTH CARE EDUCATION/TRAINING PROGRAM

## 2025-05-28 PROCEDURE — 700105 HCHG RX REV CODE 258: Performed by: ANESTHESIOLOGY

## 2025-05-28 PROCEDURE — 700111 HCHG RX REV CODE 636 W/ 250 OVERRIDE (IP): Performed by: ANESTHESIOLOGY

## 2025-05-28 PROCEDURE — 36415 COLL VENOUS BLD VENIPUNCTURE: CPT

## 2025-05-28 PROCEDURE — 700101 HCHG RX REV CODE 250: Performed by: ANESTHESIOLOGY

## 2025-05-28 PROCEDURE — 770002 HCHG ROOM/CARE - OB PRIVATE (112)

## 2025-05-28 PROCEDURE — 700111 HCHG RX REV CODE 636 W/ 250 OVERRIDE (IP): Performed by: PHYSICIAN ASSISTANT

## 2025-05-28 RX ORDER — DIPHENHYDRAMINE HYDROCHLORIDE 50 MG/ML
25 INJECTION, SOLUTION INTRAMUSCULAR; INTRAVENOUS ONCE
Status: COMPLETED | OUTPATIENT
Start: 2025-05-28 | End: 2025-05-28

## 2025-05-28 RX ORDER — BUPIVACAINE HYDROCHLORIDE 2.5 MG/ML
INJECTION, SOLUTION EPIDURAL; INFILTRATION; INTRACAUDAL; PERINEURAL
Status: COMPLETED | OUTPATIENT
Start: 2025-05-28 | End: 2025-05-28

## 2025-05-28 RX ORDER — ROPIVACAINE HYDROCHLORIDE 2 MG/ML
INJECTION, SOLUTION EPIDURAL; INFILTRATION; PERINEURAL CONTINUOUS
Status: DISCONTINUED | OUTPATIENT
Start: 2025-05-28 | End: 2025-05-28

## 2025-05-28 RX ORDER — BUPIVACAINE HYDROCHLORIDE 2.5 MG/ML
INJECTION, SOLUTION EPIDURAL; INFILTRATION; INTRACAUDAL; PERINEURAL
Status: COMPLETED
Start: 2025-05-28 | End: 2025-05-28

## 2025-05-28 RX ORDER — LIDOCAINE HYDROCHLORIDE AND EPINEPHRINE 15; 5 MG/ML; UG/ML
INJECTION, SOLUTION EPIDURAL
Status: COMPLETED | OUTPATIENT
Start: 2025-05-28 | End: 2025-05-28

## 2025-05-28 RX ORDER — SODIUM CHLORIDE, SODIUM LACTATE, POTASSIUM CHLORIDE, AND CALCIUM CHLORIDE .6; .31; .03; .02 G/100ML; G/100ML; G/100ML; G/100ML
1000 INJECTION, SOLUTION INTRAVENOUS
Status: COMPLETED | OUTPATIENT
Start: 2025-05-28 | End: 2025-05-28

## 2025-05-28 RX ORDER — DEXTROSE, SODIUM CHLORIDE, SODIUM LACTATE, POTASSIUM CHLORIDE, AND CALCIUM CHLORIDE 5; .6; .31; .03; .02 G/100ML; G/100ML; G/100ML; G/100ML; G/100ML
INJECTION, SOLUTION INTRAVENOUS CONTINUOUS
Status: DISCONTINUED | OUTPATIENT
Start: 2025-05-28 | End: 2025-05-29

## 2025-05-28 RX ORDER — EPHEDRINE SULFATE 50 MG/ML
5 INJECTION, SOLUTION INTRAVENOUS
Status: DISCONTINUED | OUTPATIENT
Start: 2025-05-28 | End: 2025-05-29

## 2025-05-28 RX ORDER — SODIUM CHLORIDE, SODIUM LACTATE, POTASSIUM CHLORIDE, AND CALCIUM CHLORIDE .6; .31; .03; .02 G/100ML; G/100ML; G/100ML; G/100ML
250 INJECTION, SOLUTION INTRAVENOUS PRN
Status: DISCONTINUED | OUTPATIENT
Start: 2025-05-28 | End: 2025-05-29

## 2025-05-28 RX ORDER — ROPIVACAINE HYDROCHLORIDE 2 MG/ML
INJECTION, SOLUTION EPIDURAL; INFILTRATION; PERINEURAL CONTINUOUS
Status: DISCONTINUED | OUTPATIENT
Start: 2025-05-28 | End: 2025-05-29

## 2025-05-28 RX ADMIN — BUPIVACAINE HYDROCHLORIDE 7 ML: 2.5 INJECTION, SOLUTION EPIDURAL; INFILTRATION; INTRACAUDAL at 22:19

## 2025-05-28 RX ADMIN — BUPIVACAINE HYDROCHLORIDE 8 ML: 2.5 INJECTION, SOLUTION EPIDURAL; INFILTRATION; INTRACAUDAL at 17:08

## 2025-05-28 RX ADMIN — SODIUM CHLORIDE, POTASSIUM CHLORIDE, SODIUM LACTATE AND CALCIUM CHLORIDE 200 ML: 600; 310; 30; 20 INJECTION, SOLUTION INTRAVENOUS at 03:00

## 2025-05-28 RX ADMIN — OXYTOCIN 2 MILLI-UNITS/MIN: 10 INJECTION, SOLUTION INTRAMUSCULAR; INTRAVENOUS at 06:54

## 2025-05-28 RX ADMIN — LIDOCAINE HYDROCHLORIDE,EPINEPHRINE BITARTRATE 3 ML: 15; .005 INJECTION, SOLUTION EPIDURAL; INFILTRATION; INTRACAUDAL; PERINEURAL at 03:10

## 2025-05-28 RX ADMIN — SODIUM CHLORIDE, POTASSIUM CHLORIDE, SODIUM LACTATE AND CALCIUM CHLORIDE: 600; 310; 30; 20 INJECTION, SOLUTION INTRAVENOUS at 06:53

## 2025-05-28 RX ADMIN — OXYTOCIN 18 MILLI-UNITS/MIN: 10 INJECTION, SOLUTION INTRAMUSCULAR; INTRAVENOUS at 23:18

## 2025-05-28 RX ADMIN — SODIUM CHLORIDE, SODIUM LACTATE, POTASSIUM CHLORIDE, CALCIUM CHLORIDE AND DEXTROSE MONOHYDRATE: 5; 600; 310; 30; 20 INJECTION, SOLUTION INTRAVENOUS at 22:41

## 2025-05-28 RX ADMIN — FENTANYL CITRATE 100 MCG: 50 INJECTION, SOLUTION INTRAMUSCULAR; INTRAVENOUS at 03:06

## 2025-05-28 RX ADMIN — DIPHENHYDRAMINE HYDROCHLORIDE 25 MG: 50 INJECTION, SOLUTION INTRAMUSCULAR; INTRAVENOUS at 05:47

## 2025-05-28 RX ADMIN — ROPIVACAINE HYDROCHLORIDE: 2 INJECTION, SOLUTION EPIDURAL; INFILTRATION; PERINEURAL at 12:14

## 2025-05-28 RX ADMIN — BUPIVACAINE HYDROCHLORIDE 8 ML: 2.5 INJECTION, SOLUTION EPIDURAL; INFILTRATION; INTRACAUDAL at 03:06

## 2025-05-28 RX ADMIN — ROPIVACAINE HYDROCHLORIDE: 2 INJECTION, SOLUTION EPIDURAL; INFILTRATION; PERINEURAL at 03:10

## 2025-05-28 RX ADMIN — ROPIVACAINE HYDROCHLORIDE: 2 INJECTION, SOLUTION EPIDURAL; INFILTRATION; PERINEURAL at 18:52

## 2025-05-28 NOTE — ANESTHESIA PROCEDURE NOTES
Epidural Block    Date/Time: 5/28/2025 3:06 AM    Performed by: Alayna Smith M.D.  Authorized by: lAayna Smith M.D.    Patient Location:  OB  Start Time:  5/28/2025 3:06 AM  End Time:  5/28/2025 3:10 AM  Reason for Block: labor analgesia    patient identified, IV checked, site marked, risks and benefits discussed, surgical consent, monitors and equipment checked and pre-op evaluation    Patient Position:  Sitting  Prep: ChloraPrep, patient draped and sterile technique    Monitoring:  Blood pressure, continuous pulse oximetry and heart rate  Approach:  Midline  Location:  L3-L4  Injection Technique:  SHELTON air and SHELTON saline  Skin infiltration:  Lidocaine  Strength:  1%  Dose:  3ml  Needle Type:  Tuohy  Needle Gauge:  17 G  Needle Length:  3.5 in  Loss of resistance::  4.5  Catheter Size:  19 G  Catheter at Skin Depth:  10  Test Dose Result:  Negative

## 2025-05-28 NOTE — PROGRESS NOTES
"[facility]  Labor & Delivery Progress Note    Lina Lazo is a 39 y.o. female  at 39w6d by LMP c/w 10w US who presents for induction of labor for pre-eclampsia without severe features.    Subjective:  Patient arrived on  for induction of labor for pre-eclampsia without severe features. She received a cervidil at admission which came out early this morning. Patient received early epidural. Cook balloon was then placed with 80/80 with pitocin at 0925. Patient tolerated the procedure well.    Uterine contractions: affirms  Leakage of fluid: denies  Vaginal bleeding: denies  Fetal movement: affirms    Headache: denies  Blurred vision: denies  SOB: denies  Epigastric pain: denies  Worsening BLE edema: denies    Objective:   /89   Pulse 88   Temp 36.8 °C (98.2 °F) (Temporal)   Resp 17   Ht 1.6 m (5' 3\")   Wt 87.5 kg (193 lb)   SpO2 97%     GEN: NAD, alert, calm, cooperative, laying comfortably in bed  HEENT: NCAT, EOMI  CV: warm and well perfused, 1+ BLE edema, radial pulses 2+  RESP: no cough, breathing comfortably on RA  ABS: soft, NTTP, gravid  : no lesions  MSK: moving all extremities    FHT: Cat I, baseline , variability moderate, +accels, -decels, +UC irregular    SVE: 2/80/-3    AROM: no  IUPC: no  FSE no  Epidural: yes  BSUS: cephalic    Meds:   Current Medications[1]     Labs:  Recent Results (from the past 24 hour(s))   POCT FETAL NONSTRESS TEST    Collection Time    13  1:15 PM       Component Value Range    NST Indications SUA, SGA      NST Baseline 130      NST Uterine Activity none      NST Acoustic Stimulation vas      NST Assessment reactive      NST Action Necessary        NST Other Data        NST Return        NST Read By         Recent Labs     24  0834 25  0747 25  0758 25  2210 25  0112   ABOGROUP A  --   --   --   --    ABSCRN NEG  --   --   --   --    HEMOGLOBIN 13.4   < >  --    < > 12.6   PLATELETCT 181  "  < >  --    < > 121*   [id number]  --   --  103  --   --    RUBELLAIGG 130.00  --   --   --   --    HEPBSAG Non-Reactive  --   --   --   --    HEPCAB Non-Reactive  --   --   --   --     < > = values in this interval not displayed.       Assessment/Plan:   Lina Lazo is a 39 y.o. female  at 39w6d by LMP c/w 10w US who presents for induction of labor for pre-eclampsia without severe features.    #SIUP at 39w6d by LMP c/w 10w US, MARCELO 25  - prenatal care with [facility]  - continue prenatal vitamins  - Labor status: Prolonged latent labor.    #fetal status, Cat I  - reassuring  - continuous fetal monitoring    #labor induction/augmentation  - Due to unfavorable cervix, we will use Cervidil, Cook  - pitocin titrated to adequate contractions  - clear liquid diet  - pt can epidural when she desires  - mIVF as indicated     #thrombocytopenia    #pre-eclampsia without severe features    #GBS neg    #rubella: NR    #HIV NR, RPR NR, HBsAg NR, Hep C NR, GC/CT neg/neg    #blood type A+/-    #contraception: TBD    #HCM:  - Tdap: 3/6/25      Rehana Soriano M.D.         [1]   Current Facility-Administered Medications:     ropivacaine 0.2 % (Naropin) injection, , Epidural, Continuous, Alayna Smith M.D., New Bag at 25 0310    ePHEDrine injection 5 mg, 5 mg, Intravenous, Q5 MIN PRN **AND** Notify Anesthesiologist if ephedrine given and for sustained hypotension (more than 2 minutes), , , Once **AND** For Hypotension: Place patient in left lateral tilt to achieve uterine displacement and elevate legs., , , PRN **AND** Hypotension: Oxygen Continuous, , , CONTINUOUS **AND** LR (Bolus) infusion 250 mL, 250 mL, Intravenous, PRN, Alayna Smiht M.D.    oxytocin (Pitocin) 0.02 Units/mL, 0-20 silvana-units/min, Intravenous, Continuous, Wai Wesley, P.AMaximo, Last Rate: 6 mL/hr at 25, 2 silvana-units/min at 25    LR infusion, , Intravenous, Continuous, Eloina Baig M.D., Last  Rate: 100 mL/hr at 05/28/25 0653, New Bag at 05/28/25 0653    lidocaine (XYLOCAINE) 1%  injection, 20 mL, Subcutaneous, Once PRN, Eloina Baig M.D.    terbutaline (Brethine) injection 0.25 mg, 0.25 mg, Subcutaneous, Once PRN, Eloina Baig M.D.    oxytocin (Pitocin) infusion bolus (for post delivery), 20 Units, Intravenous, Once, Held at 05/27/25 1415 **FOLLOWED BY** oxytocin (Pitocin) infusion (for post delivery), 125 mL/hr, Intravenous, Continuous, Eloina Baig M.D., Held at 05/27/25 1515    oxytocin (Pitocin) injection 10 Units, 10 Units, Intramuscular, Once PRN, Eloina Baig M.D.    ibuprofen (Motrin) tablet 800 mg, 800 mg, Oral, Once PRN, Eloina Baig M.D.    metoclopramide (Reglan) tablet 10 mg, 10 mg, Oral, Q6HRS PRN, Eloina Baig M.D.    acetaminophen (Tylenol) tablet 1,000 mg, 1,000 mg, Oral, Q6HRS PRN, Eloina Baig M.D.

## 2025-05-28 NOTE — CARE PLAN
Problem: Knowledge Deficit - L&D  Goal: Patient and family/caregivers will demonstrate understanding of plan of care, disease process/condition, diagnostic tests and medications  Outcome: Progressing  Note: Continuous education with care. Interpretor in use when discussing care      Problem: Psychosocial - L&D  Goal: Patient's level of anxiety will decrease  Outcome: Progressing  Goal: Patient will be able to discuss coping skills during hospitalization  Outcome: Progressing  Note: Good support from staff   Goal: Patient's ability to re-evaluate and adapt role responsibilities will improve  Outcome: Progressing  Goal: Spiritual and cultural needs incorporated into hospitalization  Outcome: Progressing     Problem: Risk for Venous Thromboembolism (VTE)  Goal: VTE prevention measures will be implemented and patient will remain free from VTE  Outcome: Progressing  Note: SCD placed on pt, pt has pitting edema in lower extremities.      Problem: Risk for Infection and Impaired Wound Healing  Goal: Patient will remain free from infection  Outcome: Progressing  Note: Routine VS and temp to detect infection      Problem: Risk for Fluid Imbalance  Goal: Patient's fluid volume balance will be maintained or improve  Outcome: Progressing  Note: Limited IV fluids infused. Orally hydrating until IV meds are started.      Problem: Risk for Injury  Goal: Patient and fetus will be free of preventable injury/complications  Outcome: Progressing  Note: Call light in place when in need of assistance. Pt aware of cords and other tripping hazards in room.      Problem: Pain  Goal: Patient's pain will be alleviated or reduced to the patient’s comfort goal  Outcome: Progressing  Note: Pt educated on pain management options, including epidural and IV pain meds      Problem: Discharge Barriers/Planning  Goal: Patient's continuum of care needs are met  Outcome: Progressing     Problem: Knowledge Deficit - Pregnancy Induced  Hypertension  Goal: Patient verbalizes understanding of disease process and appropriate treatment plan  Outcome: Progressing     Problem: Deficient Fluid Volume  Goal: Patient exhibits Hct within normal limits and physiological edema with no signs of pitting  Outcome: Progressing     Problem: Decreased Cardiac Output  Goal: Patient remains normotensive and reports absence and/or decreased episodes of dyspnea  Outcome: Progressing  Note: Q1 Bps and PIH labs      Problem: Altered Tissue Perfusion (Uteroplacental)  Goal: Patient has normal CNS reactivity on nonstress test, is free of late decelerations and has no decrease in FHR  Outcome: Progressing     Problem: Risk for Maternal Injury  Goal: Patient is free of signs of cerebral ischemia (visual disturbances, headache, changes in mentation) and displays normal levels of clotting factors and liver enzymes  Outcome: Progressing  Note: Routine Assessment during care.   Goal: In the event of a seizure  Outcome: Progressing     Problem: Nutrition  Goal: Patient's understanding of dietary modifications to reduce hypertension will improve  Outcome: Progressing   The patient is Stable - Low risk of patient condition declining or worsening         Progress made toward(s) clinical / shift goals:  Progressing

## 2025-05-28 NOTE — CARE PLAN
Problem: Knowledge Deficit - L&D  Goal: Patient and family/caregivers will demonstrate understanding of plan of care, disease process/condition, diagnostic tests and medications  Outcome: Progressing  Note: Patient encouraged to ask questions regarding care. Informed consent obtained for all procedures, exams, and medication administrations.   All education provided to patient in [place]     Problem: Pain  Goal: Patient's pain will be alleviated or reduced to the patient’s comfort goal  Outcome: Progressing  Flowsheets  Taken 5/28/2025 0600 by Aide Ramos, R.N.  OB Pain Level: 0-No Pain  OB Pain Intervention: Epidural  Taken 5/27/2025 1845 by Isabela Joshi R.NMaximo  Pain Rating Scale (NPRS): 3  Note: Epidural used for pain management.  Pain assessed hourly.      The patient is Watcher - Medium risk of patient condition declining or worsening    Shift Goals  Clinical Goals: Safe delivery and labor progression  Patient Goals: Healthy baby  Family Goals: Support    Progress made toward(s) clinical / shift goals:      Patient is not progressing towards the following goals:

## 2025-05-28 NOTE — PROGRESS NOTES
1905 - Report received from DARNELL Joshi. Patient resting in bed.     2356 - Report given to Flora LOPEZ.    0140 - Care resumed     0256  - Dr. Smith at bedside for epidural placement.     0309 - Negative test dose appreciated.     0340 - Cervidil removed.

## 2025-05-28 NOTE — CARE PLAN
The patient is Watcher - Medium risk of patient condition declining or worsening    Shift Goals  Clinical Goals: Safe cervical dilation  Patient Goals: Healthy mom, healthy baby  Family Goals: Support    Progress made toward(s) clinical / shift goals:    Problem: Knowledge Deficit - L&D  Goal: Patient and family/caregivers will demonstrate understanding of plan of care, disease process/condition, diagnostic tests and medications  Outcome: Progressing   -POC discussed with pt.   Problem: Risk for Infection and Impaired Wound Healing  Goal: Patient will remain free from infection  Outcome: Progressing   -Pt afebrile. No s/s of infection noted.     Patient is not progressing towards the following goals:

## 2025-05-28 NOTE — PROGRESS NOTES
Pt resting with epidural. D/w pt plan to recheck, pt agreeable.     Cervix: /-3, mid, soft, vtx, BBOW  NST: Cat 1 per my read, FHT baseline 120bpm, +accels, no decels, mod variability  TOCO: UCs q 3-5 min    A/P: IUP at 39w6d - start Pit now, as UCs have calmed down after Cervadil removed, continue epidural, AROM when appropriate, anticipate .

## 2025-05-28 NOTE — PROGRESS NOTES
1315: Pt sent in for added IOL for elevated BP. Pt denies consistent frequent ctx, LOF, VB, +FM. Pt denies HA at this moment, vision changes, RUQ pain. DTR+1, no clonus. Pt understands to notify RN if HA or vision changes occur or any other changes during care. PT states that her swelling in the lower extremities have gotten worse since the last time she was here for her first attempt at an IOL. MD aware of pt arrival and status, orders placed. Pending SVE from MD. Interpretor Marlene [id number] being used.     1551: Interpretor Yves [id number] used for placement of cervidil. Pt understands education on it an all questions answered. Pt aware that it can stay place for up to 12hrs.     1656: Dr. Baig aware of labs results. Orders for repeat CBC 0100.     16363: Report given to Aide LOPEZ, at bedside, plan of care discussed.

## 2025-05-28 NOTE — ANESTHESIA PREPROCEDURE EVALUATION
Date: 05/28/25  Procedure: Labor Epidural       Plan for c/s using current epidural      Relevant Problems   Other   (positive) Advanced maternal age, primigravida, antepartum       Physical Exam    Airway   Mallampati: II  TM distance: >3 FB  Neck ROM: full       Cardiovascular - normal exam  Rhythm: regular  Rate: normal    (-) murmur     Dental - normal exam           Pulmonary - normal examBreath sounds clear to auscultation     Abdominal    Neurological - normal exam                   Anesthesia Plan    ASA 2       Plan - epidural   Neuraxial block will be labor analgesia                  Pertinent diagnostic labs and testing reviewed    Informed Consent:    Anesthetic plan and risks discussed with patient.

## 2025-05-28 NOTE — PROGRESS NOTES
1905 - Report received from DARNELL Joshi. Patient resting in bed.     2356 - Report given to Flora LOPEZ.    0140 - Care resumed     0256  - Dr. Smith at bedside for epidural placement.     0309 - Negative test dose appreciated.     0340 - Cervidil removed.     0351 - EMIGDIO Wesley updated on cervidil removal, SVE, and epidural placement. Plan for EMIGDIO Wesley to check patient at 0600.      0520 - Patient c/o itching. EMIGDIO Wesley notified. See MAR for orders.

## 2025-05-28 NOTE — PROGRESS NOTES
0700: Report received from Aide LOPEZ. POC discussed.     0925: Dr. Soriano at bedside. SVE by MD. 2/80/-3. Liam's catheter placed by MD. Uterine: 80 mL, Vaginal 80 mL.    1755: Dr. Soriano at bedside POC discussed.     1703: Dr. Molina at bedside for epidural bolus.     1745: Report given to Aide LOPEZ. POC discussed.

## 2025-05-29 LAB
ALBUMIN SERPL BCP-MCNC: 2.6 G/DL (ref 3.2–4.9)
ALBUMIN/GLOB SERPL: 0.9 G/DL
ALP SERPL-CCNC: 211 U/L (ref 30–99)
ALT SERPL-CCNC: 75 U/L (ref 2–50)
ANION GAP SERPL CALC-SCNC: 12 MMOL/L (ref 7–16)
AST SERPL-CCNC: 69 U/L (ref 12–45)
BASOPHILS # BLD AUTO: 0.3 % (ref 0–1.8)
BASOPHILS # BLD: 0.06 K/UL (ref 0–0.12)
BILIRUB SERPL-MCNC: 0.6 MG/DL (ref 0.1–1.5)
BUN SERPL-MCNC: 21 MG/DL (ref 8–22)
CALCIUM ALBUM COR SERPL-MCNC: 9.1 MG/DL (ref 8.5–10.5)
CALCIUM SERPL-MCNC: 8 MG/DL (ref 8.5–10.5)
CHLORIDE SERPL-SCNC: 105 MMOL/L (ref 96–112)
CO2 SERPL-SCNC: 15 MMOL/L (ref 20–33)
CREAT SERPL-MCNC: 1.7 MG/DL (ref 0.5–1.4)
EOSINOPHIL # BLD AUTO: 0.01 K/UL (ref 0–0.51)
EOSINOPHIL NFR BLD: 0 % (ref 0–6.9)
ERYTHROCYTE [DISTWIDTH] IN BLOOD BY AUTOMATED COUNT: 45.3 FL (ref 35.9–50)
ERYTHROCYTE [DISTWIDTH] IN BLOOD BY AUTOMATED COUNT: 45.8 FL (ref 35.9–50)
GFR SERPLBLD CREATININE-BSD FMLA CKD-EPI: 39 ML/MIN/1.73 M 2
GLOBULIN SER CALC-MCNC: 3 G/DL (ref 1.9–3.5)
GLUCOSE SERPL-MCNC: 89 MG/DL (ref 65–99)
HCT VFR BLD AUTO: 36.2 % (ref 37–47)
HCT VFR BLD AUTO: 39.9 % (ref 37–47)
HGB BLD-MCNC: 12.3 G/DL (ref 12–16)
HGB BLD-MCNC: 13.9 G/DL (ref 12–16)
IMM GRANULOCYTES # BLD AUTO: 0.22 K/UL (ref 0–0.11)
IMM GRANULOCYTES NFR BLD AUTO: 1 % (ref 0–0.9)
LYMPHOCYTES # BLD AUTO: 1.16 K/UL (ref 1–4.8)
LYMPHOCYTES NFR BLD: 5.4 % (ref 22–41)
MAGNESIUM SERPL-MCNC: 3.8 MG/DL (ref 1.5–2.5)
MAGNESIUM SERPL-MCNC: 4.1 MG/DL (ref 1.5–2.5)
MAGNESIUM SERPL-MCNC: 6.3 MG/DL (ref 1.5–2.5)
MAGNESIUM SERPL-MCNC: 7.3 MG/DL (ref 1.5–2.5)
MCH RBC QN AUTO: 32.4 PG (ref 27–33)
MCH RBC QN AUTO: 33 PG (ref 27–33)
MCHC RBC AUTO-ENTMCNC: 34 G/DL (ref 32.2–35.5)
MCHC RBC AUTO-ENTMCNC: 34.8 G/DL (ref 32.2–35.5)
MCV RBC AUTO: 94.8 FL (ref 81.4–97.8)
MCV RBC AUTO: 95.3 FL (ref 81.4–97.8)
MONOCYTES # BLD AUTO: 0.84 K/UL (ref 0–0.85)
MONOCYTES NFR BLD AUTO: 3.9 % (ref 0–13.4)
NEUTROPHILS # BLD AUTO: 19.33 K/UL (ref 1.82–7.42)
NEUTROPHILS NFR BLD: 89.4 % (ref 44–72)
NRBC # BLD AUTO: 0 K/UL
NRBC BLD-RTO: 0 /100 WBC (ref 0–0.2)
PLATELET # BLD AUTO: 101 K/UL (ref 164–446)
PLATELET # BLD AUTO: 120 K/UL (ref 164–446)
PMV BLD AUTO: 11.9 FL (ref 9–12.9)
PMV BLD AUTO: 12 FL (ref 9–12.9)
POTASSIUM SERPL-SCNC: 4.2 MMOL/L (ref 3.6–5.5)
PROT SERPL-MCNC: 5.6 G/DL (ref 6–8.2)
RBC # BLD AUTO: 3.8 M/UL (ref 4.2–5.4)
RBC # BLD AUTO: 4.21 M/UL (ref 4.2–5.4)
SODIUM SERPL-SCNC: 132 MMOL/L (ref 135–145)
T PALLIDUM AB SER QL IA: NORMAL
WBC # BLD AUTO: 21.6 K/UL (ref 4.8–10.8)
WBC # BLD AUTO: 26.7 K/UL (ref 4.8–10.8)

## 2025-05-29 PROCEDURE — A9270 NON-COVERED ITEM OR SERVICE: HCPCS

## 2025-05-29 PROCEDURE — 700111 HCHG RX REV CODE 636 W/ 250 OVERRIDE (IP): Mod: JZ | Performed by: NURSE PRACTITIONER

## 2025-05-29 PROCEDURE — 700101 HCHG RX REV CODE 250: Performed by: STUDENT IN AN ORGANIZED HEALTH CARE EDUCATION/TRAINING PROGRAM

## 2025-05-29 PROCEDURE — 59514 CESAREAN DELIVERY ONLY: CPT | Mod: 80 | Performed by: NURSE PRACTITIONER

## 2025-05-29 PROCEDURE — 160009 HCHG ANES TIME/MIN: Performed by: OBSTETRICS & GYNECOLOGY

## 2025-05-29 PROCEDURE — 700111 HCHG RX REV CODE 636 W/ 250 OVERRIDE (IP): Performed by: STUDENT IN AN ORGANIZED HEALTH CARE EDUCATION/TRAINING PROGRAM

## 2025-05-29 PROCEDURE — 700102 HCHG RX REV CODE 250 W/ 637 OVERRIDE(OP)

## 2025-05-29 PROCEDURE — 59514 CESAREAN DELIVERY ONLY: CPT | Performed by: OBSTETRICS & GYNECOLOGY

## 2025-05-29 PROCEDURE — 83735 ASSAY OF MAGNESIUM: CPT | Mod: 91

## 2025-05-29 PROCEDURE — C1755 CATHETER, INTRASPINAL: HCPCS | Performed by: OBSTETRICS & GYNECOLOGY

## 2025-05-29 PROCEDURE — 700105 HCHG RX REV CODE 258: Performed by: OBSTETRICS & GYNECOLOGY

## 2025-05-29 PROCEDURE — A9270 NON-COVERED ITEM OR SERVICE: HCPCS | Performed by: STUDENT IN AN ORGANIZED HEALTH CARE EDUCATION/TRAINING PROGRAM

## 2025-05-29 PROCEDURE — 160048 HCHG OR STATISTICAL LEVEL 1-5: Performed by: OBSTETRICS & GYNECOLOGY

## 2025-05-29 PROCEDURE — 700111 HCHG RX REV CODE 636 W/ 250 OVERRIDE (IP): Mod: JZ | Performed by: OBSTETRICS & GYNECOLOGY

## 2025-05-29 PROCEDURE — 770002 HCHG ROOM/CARE - OB PRIVATE (112)

## 2025-05-29 PROCEDURE — 80053 COMPREHEN METABOLIC PANEL: CPT

## 2025-05-29 PROCEDURE — 700111 HCHG RX REV CODE 636 W/ 250 OVERRIDE (IP): Mod: JZ | Performed by: STUDENT IN AN ORGANIZED HEALTH CARE EDUCATION/TRAINING PROGRAM

## 2025-05-29 PROCEDURE — 700102 HCHG RX REV CODE 250 W/ 637 OVERRIDE(OP): Performed by: OBSTETRICS & GYNECOLOGY

## 2025-05-29 PROCEDURE — 85027 COMPLETE CBC AUTOMATED: CPT

## 2025-05-29 PROCEDURE — A9270 NON-COVERED ITEM OR SERVICE: HCPCS | Performed by: OBSTETRICS & GYNECOLOGY

## 2025-05-29 PROCEDURE — 160015 HCHG STAT PREOP MINUTES: Performed by: OBSTETRICS & GYNECOLOGY

## 2025-05-29 PROCEDURE — 160029 HCHG SURGERY MINUTES - 1ST 30 MINS LEVEL 4: Performed by: OBSTETRICS & GYNECOLOGY

## 2025-05-29 PROCEDURE — 700111 HCHG RX REV CODE 636 W/ 250 OVERRIDE (IP): Performed by: OBSTETRICS & GYNECOLOGY

## 2025-05-29 PROCEDURE — 700102 HCHG RX REV CODE 250 W/ 637 OVERRIDE(OP): Performed by: STUDENT IN AN ORGANIZED HEALTH CARE EDUCATION/TRAINING PROGRAM

## 2025-05-29 PROCEDURE — 700111 HCHG RX REV CODE 636 W/ 250 OVERRIDE (IP)

## 2025-05-29 PROCEDURE — 85025 COMPLETE CBC W/AUTO DIFF WBC: CPT

## 2025-05-29 PROCEDURE — 700105 HCHG RX REV CODE 258: Performed by: STUDENT IN AN ORGANIZED HEALTH CARE EDUCATION/TRAINING PROGRAM

## 2025-05-29 PROCEDURE — 160035 HCHG PACU - 1ST 60 MINS PHASE I: Performed by: OBSTETRICS & GYNECOLOGY

## 2025-05-29 PROCEDURE — 86780 TREPONEMA PALLIDUM: CPT

## 2025-05-29 PROCEDURE — 160002 HCHG RECOVERY MINUTES (STAT): Performed by: OBSTETRICS & GYNECOLOGY

## 2025-05-29 PROCEDURE — 700111 HCHG RX REV CODE 636 W/ 250 OVERRIDE (IP): Mod: JZ | Performed by: ANESTHESIOLOGY

## 2025-05-29 PROCEDURE — 160041 HCHG SURGERY MINUTES - EA ADDL 1 MIN LEVEL 4: Performed by: OBSTETRICS & GYNECOLOGY

## 2025-05-29 PROCEDURE — 36415 COLL VENOUS BLD VENIPUNCTURE: CPT

## 2025-05-29 PROCEDURE — 700101 HCHG RX REV CODE 250: Performed by: OBSTETRICS & GYNECOLOGY

## 2025-05-29 RX ORDER — ONDANSETRON 2 MG/ML
4 INJECTION INTRAMUSCULAR; INTRAVENOUS EVERY 6 HOURS PRN
Status: DISCONTINUED | OUTPATIENT
Start: 2025-05-30 | End: 2025-05-29

## 2025-05-29 RX ORDER — CALCIUM CARBONATE 500 MG/1
1000 TABLET, CHEWABLE ORAL EVERY 6 HOURS PRN
Status: DISCONTINUED | OUTPATIENT
Start: 2025-05-29 | End: 2025-06-01 | Stop reason: HOSPADM

## 2025-05-29 RX ORDER — EPHEDRINE SULFATE 50 MG/ML
5 INJECTION, SOLUTION INTRAVENOUS
Status: DISCONTINUED | OUTPATIENT
Start: 2025-05-29 | End: 2025-05-29

## 2025-05-29 RX ORDER — OXYCODONE HYDROCHLORIDE 5 MG/1
5 TABLET ORAL EVERY 4 HOURS PRN
Status: DISCONTINUED | OUTPATIENT
Start: 2025-05-29 | End: 2025-05-29

## 2025-05-29 RX ORDER — DIPHENHYDRAMINE HYDROCHLORIDE 50 MG/ML
25 INJECTION, SOLUTION INTRAMUSCULAR; INTRAVENOUS EVERY 6 HOURS PRN
Status: ACTIVE | OUTPATIENT
Start: 2025-05-29 | End: 2025-05-30

## 2025-05-29 RX ORDER — NIFEDIPINE 10 MG/1
10 CAPSULE ORAL ONCE
Status: COMPLETED | OUTPATIENT
Start: 2025-05-29 | End: 2025-05-29

## 2025-05-29 RX ORDER — ACETAMINOPHEN 500 MG
1000 TABLET ORAL EVERY 6 HOURS
Status: DISCONTINUED | OUTPATIENT
Start: 2025-05-29 | End: 2025-05-29

## 2025-05-29 RX ORDER — EPHEDRINE SULFATE 50 MG/ML
10 INJECTION, SOLUTION INTRAVENOUS
Status: ACTIVE | OUTPATIENT
Start: 2025-05-29 | End: 2025-05-30

## 2025-05-29 RX ORDER — OXYTOCIN 10 [USP'U]/ML
10 INJECTION, SOLUTION INTRAMUSCULAR; INTRAVENOUS
Status: DISCONTINUED | OUTPATIENT
Start: 2025-05-29 | End: 2025-05-29

## 2025-05-29 RX ORDER — OXYCODONE HCL 5 MG/5 ML
5 SOLUTION, ORAL ORAL
Status: COMPLETED | OUTPATIENT
Start: 2025-05-29 | End: 2025-05-29

## 2025-05-29 RX ORDER — CEFAZOLIN SODIUM 1 G/3ML
2 INJECTION, POWDER, FOR SOLUTION INTRAMUSCULAR; INTRAVENOUS ONCE
Status: COMPLETED | OUTPATIENT
Start: 2025-05-29 | End: 2025-05-29

## 2025-05-29 RX ORDER — OXYCODONE HYDROCHLORIDE 5 MG/1
5 TABLET ORAL EVERY 4 HOURS PRN
Status: ACTIVE | OUTPATIENT
Start: 2025-05-29 | End: 2025-05-30

## 2025-05-29 RX ORDER — VITAMIN A ACETATE, BETA CAROTENE, ASCORBIC ACID, CHOLECALCIFEROL, .ALPHA.-TOCOPHEROL ACETATE, DL-, THIAMINE MONONITRATE, RIBOFLAVIN, NIACINAMIDE, PYRIDOXINE HYDROCHLORIDE, FOLIC ACID, CYANOCOBALAMIN, CALCIUM CARBONATE, FERROUS FUMARATE, ZINC OXIDE, CUPRIC OXIDE 3080; 12; 120; 400; 1; 1.84; 3; 20; 22; 920; 25; 200; 27; 10; 2 [IU]/1; UG/1; MG/1; [IU]/1; MG/1; MG/1; MG/1; MG/1; MG/1; [IU]/1; MG/1; MG/1; MG/1; MG/1; MG/1
1 TABLET, FILM COATED ORAL
Status: DISCONTINUED | OUTPATIENT
Start: 2025-05-29 | End: 2025-06-01 | Stop reason: HOSPADM

## 2025-05-29 RX ORDER — SODIUM CHLORIDE, SODIUM LACTATE, POTASSIUM CHLORIDE, CALCIUM CHLORIDE 600; 310; 30; 20 MG/100ML; MG/100ML; MG/100ML; MG/100ML
2000 INJECTION, SOLUTION INTRAVENOUS PRN
Status: DISCONTINUED | OUTPATIENT
Start: 2025-05-29 | End: 2025-06-01 | Stop reason: HOSPADM

## 2025-05-29 RX ORDER — METOCLOPRAMIDE HYDROCHLORIDE 5 MG/ML
10 INJECTION INTRAMUSCULAR; INTRAVENOUS ONCE
Status: COMPLETED | OUTPATIENT
Start: 2025-05-29 | End: 2025-05-29

## 2025-05-29 RX ORDER — MIDAZOLAM HYDROCHLORIDE 1 MG/ML
INJECTION INTRAMUSCULAR; INTRAVENOUS PRN
Status: DISCONTINUED | OUTPATIENT
Start: 2025-05-29 | End: 2025-05-29 | Stop reason: SURG

## 2025-05-29 RX ORDER — OXYCODONE HYDROCHLORIDE 10 MG/1
10 TABLET ORAL EVERY 4 HOURS PRN
Status: DISCONTINUED | OUTPATIENT
Start: 2025-05-30 | End: 2025-05-29

## 2025-05-29 RX ORDER — MAGNESIUM SULFATE HEPTAHYDRATE 40 MG/ML
4 INJECTION, SOLUTION INTRAVENOUS ONCE
Status: COMPLETED | OUTPATIENT
Start: 2025-05-29 | End: 2025-05-29

## 2025-05-29 RX ORDER — HYDRALAZINE HYDROCHLORIDE 20 MG/ML
5 INJECTION INTRAMUSCULAR; INTRAVENOUS
Status: DISCONTINUED | OUTPATIENT
Start: 2025-05-29 | End: 2025-05-29

## 2025-05-29 RX ORDER — ONDANSETRON 2 MG/ML
4 INJECTION INTRAMUSCULAR; INTRAVENOUS EVERY 6 HOURS PRN
Status: ACTIVE | OUTPATIENT
Start: 2025-05-29 | End: 2025-05-30

## 2025-05-29 RX ORDER — LIDOCAINE HYDROCHLORIDE AND EPINEPHRINE 20; 5 MG/ML; UG/ML
INJECTION, SOLUTION EPIDURAL; INFILTRATION; INTRACAUDAL; PERINEURAL PRN
Status: DISCONTINUED | OUTPATIENT
Start: 2025-05-29 | End: 2025-05-29 | Stop reason: SURG

## 2025-05-29 RX ORDER — LABETALOL HYDROCHLORIDE 5 MG/ML
20-80 INJECTION, SOLUTION INTRAVENOUS
Status: DISCONTINUED | OUTPATIENT
Start: 2025-05-29 | End: 2025-05-29

## 2025-05-29 RX ORDER — ALBUTEROL SULFATE 5 MG/ML
2.5 SOLUTION RESPIRATORY (INHALATION)
Status: DISCONTINUED | OUTPATIENT
Start: 2025-05-29 | End: 2025-05-29

## 2025-05-29 RX ORDER — AZITHROMYCIN 500 MG/5ML
500 INJECTION, POWDER, LYOPHILIZED, FOR SOLUTION INTRAVENOUS EVERY 24 HOURS
Status: DISCONTINUED | OUTPATIENT
Start: 2025-05-29 | End: 2025-05-29

## 2025-05-29 RX ORDER — ONDANSETRON 4 MG/1
TABLET, ORALLY DISINTEGRATING ORAL
Status: COMPLETED
Start: 2025-05-29 | End: 2025-05-29

## 2025-05-29 RX ORDER — EPHEDRINE SULFATE 50 MG/ML
10 INJECTION, SOLUTION INTRAVENOUS
Status: DISCONTINUED | OUTPATIENT
Start: 2025-05-29 | End: 2025-05-29

## 2025-05-29 RX ORDER — CARBOPROST TROMETHAMINE 250 UG/ML
INJECTION, SOLUTION INTRAMUSCULAR PRN
Status: DISCONTINUED | OUTPATIENT
Start: 2025-05-29 | End: 2025-05-29 | Stop reason: SURG

## 2025-05-29 RX ORDER — HYDROMORPHONE HYDROCHLORIDE 1 MG/ML
0.4 INJECTION, SOLUTION INTRAMUSCULAR; INTRAVENOUS; SUBCUTANEOUS
Status: DISCONTINUED | OUTPATIENT
Start: 2025-05-29 | End: 2025-05-29

## 2025-05-29 RX ORDER — HYDRALAZINE HYDROCHLORIDE 20 MG/ML
5-10 INJECTION INTRAMUSCULAR; INTRAVENOUS
Status: DISCONTINUED | OUTPATIENT
Start: 2025-05-29 | End: 2025-05-29

## 2025-05-29 RX ORDER — ACETAMINOPHEN 500 MG
1000 TABLET ORAL EVERY 6 HOURS PRN
Status: DISCONTINUED | OUTPATIENT
Start: 2025-06-02 | End: 2025-06-01 | Stop reason: HOSPADM

## 2025-05-29 RX ORDER — DIPHENOXYLATE HYDROCHLORIDE AND ATROPINE SULFATE 2.5; .025 MG/1; MG/1
2 TABLET ORAL ONCE
Status: COMPLETED | OUTPATIENT
Start: 2025-05-29 | End: 2025-05-29

## 2025-05-29 RX ORDER — DIPHENHYDRAMINE HYDROCHLORIDE 50 MG/ML
12.5 INJECTION, SOLUTION INTRAMUSCULAR; INTRAVENOUS EVERY 6 HOURS PRN
Status: ACTIVE | OUTPATIENT
Start: 2025-05-29 | End: 2025-05-30

## 2025-05-29 RX ORDER — ENOXAPARIN SODIUM 100 MG/ML
40 INJECTION SUBCUTANEOUS DAILY
Status: COMPLETED | OUTPATIENT
Start: 2025-05-29 | End: 2025-05-31

## 2025-05-29 RX ORDER — ONDANSETRON 4 MG/1
4 TABLET, ORALLY DISINTEGRATING ORAL EVERY 6 HOURS PRN
Status: DISCONTINUED | OUTPATIENT
Start: 2025-05-30 | End: 2025-05-29

## 2025-05-29 RX ORDER — SODIUM CHLORIDE, SODIUM LACTATE, POTASSIUM CHLORIDE, CALCIUM CHLORIDE 600; 310; 30; 20 MG/100ML; MG/100ML; MG/100ML; MG/100ML
INJECTION, SOLUTION INTRAVENOUS CONTINUOUS
Status: DISCONTINUED | OUTPATIENT
Start: 2025-05-29 | End: 2025-05-29

## 2025-05-29 RX ORDER — MAGNESIUM SULFATE HEPTAHYDRATE 40 MG/ML
4 INJECTION, SOLUTION INTRAVENOUS ONCE
Status: DISCONTINUED | OUTPATIENT
Start: 2025-05-29 | End: 2025-05-29

## 2025-05-29 RX ORDER — MAGNESIUM SULFATE HEPTAHYDRATE 40 MG/ML
INJECTION, SOLUTION INTRAVENOUS
Status: COMPLETED
Start: 2025-05-29 | End: 2025-05-29

## 2025-05-29 RX ORDER — OXYCODONE HCL 5 MG/5 ML
10 SOLUTION, ORAL ORAL
Status: COMPLETED | OUTPATIENT
Start: 2025-05-29 | End: 2025-05-29

## 2025-05-29 RX ORDER — SODIUM CHLORIDE 9 MG/ML
INJECTION, SOLUTION INTRAVENOUS ONCE
Status: DISCONTINUED | OUTPATIENT
Start: 2025-05-29 | End: 2025-05-29

## 2025-05-29 RX ORDER — NIFEDIPINE 10 MG/1
10 CAPSULE ORAL
Status: DISCONTINUED | OUTPATIENT
Start: 2025-05-29 | End: 2025-05-29 | Stop reason: HOSPADM

## 2025-05-29 RX ORDER — MEPERIDINE HYDROCHLORIDE 25 MG/ML
6.25 INJECTION INTRAMUSCULAR; INTRAVENOUS; SUBCUTANEOUS
Status: DISCONTINUED | OUTPATIENT
Start: 2025-05-29 | End: 2025-05-29

## 2025-05-29 RX ORDER — SIMETHICONE 125 MG
125 TABLET,CHEWABLE ORAL 4 TIMES DAILY PRN
Status: DISCONTINUED | OUTPATIENT
Start: 2025-05-29 | End: 2025-06-01 | Stop reason: HOSPADM

## 2025-05-29 RX ORDER — CITRIC ACID/SODIUM CITRATE 334-500MG
30 SOLUTION, ORAL ORAL ONCE
Status: COMPLETED | OUTPATIENT
Start: 2025-05-29 | End: 2025-05-29

## 2025-05-29 RX ORDER — DIPHENHYDRAMINE HYDROCHLORIDE 50 MG/ML
12.5 INJECTION, SOLUTION INTRAMUSCULAR; INTRAVENOUS
Status: DISCONTINUED | OUTPATIENT
Start: 2025-05-29 | End: 2025-05-29

## 2025-05-29 RX ORDER — DIPHENHYDRAMINE HYDROCHLORIDE 50 MG/ML
25 INJECTION, SOLUTION INTRAMUSCULAR; INTRAVENOUS EVERY 6 HOURS PRN
Status: DISCONTINUED | OUTPATIENT
Start: 2025-05-30 | End: 2025-06-01 | Stop reason: HOSPADM

## 2025-05-29 RX ORDER — LABETALOL 100 MG/1
200 TABLET, FILM COATED ORAL TWICE DAILY
Status: DISCONTINUED | OUTPATIENT
Start: 2025-05-29 | End: 2025-06-01 | Stop reason: HOSPADM

## 2025-05-29 RX ORDER — ACETAMINOPHEN 500 MG
1000 TABLET ORAL EVERY 6 HOURS
Status: DISCONTINUED | OUTPATIENT
Start: 2025-05-30 | End: 2025-06-01 | Stop reason: HOSPADM

## 2025-05-29 RX ORDER — IBUPROFEN 800 MG/1
800 TABLET, FILM COATED ORAL EVERY 8 HOURS PRN
Status: DISCONTINUED | OUTPATIENT
Start: 2025-06-02 | End: 2025-06-01 | Stop reason: HOSPADM

## 2025-05-29 RX ORDER — KETOROLAC TROMETHAMINE 15 MG/ML
15 INJECTION, SOLUTION INTRAMUSCULAR; INTRAVENOUS EVERY 6 HOURS
Status: COMPLETED | OUTPATIENT
Start: 2025-05-29 | End: 2025-05-30

## 2025-05-29 RX ORDER — DOCUSATE SODIUM 100 MG/1
100 CAPSULE, LIQUID FILLED ORAL 2 TIMES DAILY PRN
Status: DISCONTINUED | OUTPATIENT
Start: 2025-05-29 | End: 2025-06-01 | Stop reason: HOSPADM

## 2025-05-29 RX ORDER — MAGNESIUM SULFATE HEPTAHYDRATE 40 MG/ML
2 INJECTION, SOLUTION INTRAVENOUS CONTINUOUS
Status: DISCONTINUED | OUTPATIENT
Start: 2025-05-29 | End: 2025-05-30

## 2025-05-29 RX ORDER — OXYCODONE HYDROCHLORIDE 5 MG/1
5 TABLET ORAL EVERY 4 HOURS PRN
Status: DISCONTINUED | OUTPATIENT
Start: 2025-05-30 | End: 2025-05-29

## 2025-05-29 RX ORDER — CALCIUM GLUCONATE 98 MG/ML
1 INJECTION, SOLUTION INTRAVENOUS
Status: DISCONTINUED | OUTPATIENT
Start: 2025-05-29 | End: 2025-05-30

## 2025-05-29 RX ORDER — LABETALOL HYDROCHLORIDE 5 MG/ML
20-80 INJECTION, SOLUTION INTRAVENOUS
Status: DISCONTINUED | OUTPATIENT
Start: 2025-05-29 | End: 2025-05-29 | Stop reason: HOSPADM

## 2025-05-29 RX ORDER — ACETAMINOPHEN 500 MG
1000 TABLET ORAL EVERY 6 HOURS
Status: COMPLETED | OUTPATIENT
Start: 2025-05-29 | End: 2025-05-30

## 2025-05-29 RX ORDER — OXYCODONE HYDROCHLORIDE 10 MG/1
10 TABLET ORAL EVERY 4 HOURS PRN
Status: DISPENSED | OUTPATIENT
Start: 2025-05-29 | End: 2025-05-30

## 2025-05-29 RX ORDER — OXYCODONE HYDROCHLORIDE 10 MG/1
10 TABLET ORAL EVERY 4 HOURS PRN
Status: ACTIVE | OUTPATIENT
Start: 2025-05-29 | End: 2025-05-30

## 2025-05-29 RX ORDER — DIPHENHYDRAMINE HCL 25 MG
25 TABLET ORAL EVERY 6 HOURS PRN
Status: DISCONTINUED | OUTPATIENT
Start: 2025-05-30 | End: 2025-06-01 | Stop reason: HOSPADM

## 2025-05-29 RX ORDER — ONDANSETRON 4 MG/1
TABLET, ORALLY DISINTEGRATING ORAL
Status: ACTIVE
Start: 2025-05-29 | End: 2025-05-29

## 2025-05-29 RX ORDER — IBUPROFEN 800 MG/1
800 TABLET, FILM COATED ORAL EVERY 8 HOURS
Status: DISCONTINUED | OUTPATIENT
Start: 2025-05-30 | End: 2025-06-01 | Stop reason: HOSPADM

## 2025-05-29 RX ORDER — KETOROLAC TROMETHAMINE 15 MG/ML
15 INJECTION, SOLUTION INTRAMUSCULAR; INTRAVENOUS EVERY 6 HOURS
Status: DISCONTINUED | OUTPATIENT
Start: 2025-05-29 | End: 2025-05-29

## 2025-05-29 RX ORDER — TRANEXAMIC ACID 100 MG/ML
INJECTION, SOLUTION INTRAVENOUS PRN
Status: DISCONTINUED | OUTPATIENT
Start: 2025-05-29 | End: 2025-05-29 | Stop reason: SURG

## 2025-05-29 RX ORDER — HYDRALAZINE HYDROCHLORIDE 20 MG/ML
5-10 INJECTION INTRAMUSCULAR; INTRAVENOUS
Status: DISCONTINUED | OUTPATIENT
Start: 2025-05-29 | End: 2025-05-29 | Stop reason: HOSPADM

## 2025-05-29 RX ORDER — CARBOPROST TROMETHAMINE 250 UG/ML
INJECTION, SOLUTION INTRAMUSCULAR
Status: COMPLETED
Start: 2025-05-29 | End: 2025-05-29

## 2025-05-29 RX ORDER — HYDROMORPHONE HYDROCHLORIDE 1 MG/ML
0.1 INJECTION, SOLUTION INTRAMUSCULAR; INTRAVENOUS; SUBCUTANEOUS
Status: DISCONTINUED | OUTPATIENT
Start: 2025-05-29 | End: 2025-05-29

## 2025-05-29 RX ORDER — SODIUM CHLORIDE, SODIUM LACTATE, POTASSIUM CHLORIDE, CALCIUM CHLORIDE 600; 310; 30; 20 MG/100ML; MG/100ML; MG/100ML; MG/100ML
INJECTION, SOLUTION INTRAVENOUS
Status: DISCONTINUED | OUTPATIENT
Start: 2025-05-29 | End: 2025-05-29 | Stop reason: SURG

## 2025-05-29 RX ORDER — LABETALOL HYDROCHLORIDE 5 MG/ML
5 INJECTION, SOLUTION INTRAVENOUS
Status: DISCONTINUED | OUTPATIENT
Start: 2025-05-29 | End: 2025-05-29

## 2025-05-29 RX ORDER — ONDANSETRON 2 MG/ML
4 INJECTION INTRAMUSCULAR; INTRAVENOUS EVERY 6 HOURS PRN
Status: DISCONTINUED | OUTPATIENT
Start: 2025-05-29 | End: 2025-05-29

## 2025-05-29 RX ORDER — SODIUM CHLORIDE, SODIUM LACTATE, POTASSIUM CHLORIDE, CALCIUM CHLORIDE 600; 310; 30; 20 MG/100ML; MG/100ML; MG/100ML; MG/100ML
INJECTION, SOLUTION INTRAVENOUS CONTINUOUS
Status: DISCONTINUED | OUTPATIENT
Start: 2025-05-29 | End: 2025-05-30

## 2025-05-29 RX ORDER — HALOPERIDOL 5 MG/ML
1 INJECTION INTRAMUSCULAR
Status: DISCONTINUED | OUTPATIENT
Start: 2025-05-29 | End: 2025-05-29

## 2025-05-29 RX ORDER — NIFEDIPINE 10 MG/1
10 CAPSULE ORAL
Status: DISCONTINUED | OUTPATIENT
Start: 2025-05-29 | End: 2025-05-29

## 2025-05-29 RX ORDER — BISACODYL 10 MG
10 SUPPOSITORY, RECTAL RECTAL PRN
Status: DISCONTINUED | OUTPATIENT
Start: 2025-05-29 | End: 2025-06-01 | Stop reason: HOSPADM

## 2025-05-29 RX ORDER — HYDROMORPHONE HYDROCHLORIDE 1 MG/ML
0.2 INJECTION, SOLUTION INTRAMUSCULAR; INTRAVENOUS; SUBCUTANEOUS
Status: DISCONTINUED | OUTPATIENT
Start: 2025-05-29 | End: 2025-05-29

## 2025-05-29 RX ORDER — NIFEDIPINE 10 MG/1
CAPSULE ORAL
Status: COMPLETED
Start: 2025-05-29 | End: 2025-05-29

## 2025-05-29 RX ORDER — ONDANSETRON 4 MG/1
4 TABLET, ORALLY DISINTEGRATING ORAL EVERY 6 HOURS PRN
Status: DISCONTINUED | OUTPATIENT
Start: 2025-05-29 | End: 2025-05-29

## 2025-05-29 RX ORDER — ONDANSETRON 2 MG/ML
4 INJECTION INTRAMUSCULAR; INTRAVENOUS
Status: DISCONTINUED | OUTPATIENT
Start: 2025-05-29 | End: 2025-05-29

## 2025-05-29 RX ORDER — MISOPROSTOL 100 UG/1
TABLET ORAL PRN
Status: DISCONTINUED | OUTPATIENT
Start: 2025-05-29 | End: 2025-05-29 | Stop reason: SURG

## 2025-05-29 RX ADMIN — MAGNESIUM SULFATE IN WATER: 40 INJECTION, SOLUTION INTRAVENOUS at 09:30

## 2025-05-29 RX ADMIN — SODIUM CHLORIDE, POTASSIUM CHLORIDE, SODIUM LACTATE AND CALCIUM CHLORIDE: 600; 310; 30; 20 INJECTION, SOLUTION INTRAVENOUS at 15:07

## 2025-05-29 RX ADMIN — LABETALOL HYDROCHLORIDE 200 MG: 100 TABLET, FILM COATED ORAL at 18:29

## 2025-05-29 RX ADMIN — ONDANSETRON 4 MG: 4 TABLET, ORALLY DISINTEGRATING ORAL at 03:26

## 2025-05-29 RX ADMIN — LIDOCAINE HYDROCHLORIDE AND EPINEPHRINE 5 ML: 20; 5 INJECTION, SOLUTION EPIDURAL; INFILTRATION; INTRACAUDAL; PERINEURAL at 07:14

## 2025-05-29 RX ADMIN — FAMOTIDINE 20 MG: 10 INJECTION, SOLUTION INTRAVENOUS at 07:00

## 2025-05-29 RX ADMIN — Medication 25 MG: at 07:32

## 2025-05-29 RX ADMIN — MISOPROSTOL 400 MCG: 100 TABLET ORAL at 07:43

## 2025-05-29 RX ADMIN — TRANEXAMIC ACID 1000 MG: 100 INJECTION, SOLUTION INTRAVENOUS at 07:38

## 2025-05-29 RX ADMIN — MAGNESIUM SULFATE HEPTAHYDRATE 4 G: 4 INJECTION, SOLUTION INTRAVENOUS at 09:34

## 2025-05-29 RX ADMIN — MIDAZOLAM HYDROCHLORIDE 2 MG: 1 INJECTION, SOLUTION INTRAMUSCULAR; INTRAVENOUS at 07:49

## 2025-05-29 RX ADMIN — ACETAMINOPHEN 1000 MG: 500 TABLET ORAL at 17:10

## 2025-05-29 RX ADMIN — LIDOCAINE HYDROCHLORIDE AND EPINEPHRINE 5 ML: 20; 5 INJECTION, SOLUTION EPIDURAL; INFILTRATION; INTRACAUDAL; PERINEURAL at 07:19

## 2025-05-29 RX ADMIN — ROPIVACAINE HYDROCHLORIDE: 2 INJECTION, SOLUTION EPIDURAL; INFILTRATION; PERINEURAL at 01:57

## 2025-05-29 RX ADMIN — NIFEDIPINE 10 MG: 10 CAPSULE ORAL at 09:10

## 2025-05-29 RX ADMIN — FENTANYL CITRATE 50 MCG: 50 INJECTION, SOLUTION INTRAMUSCULAR; INTRAVENOUS at 08:31

## 2025-05-29 RX ADMIN — SODIUM CHLORIDE, POTASSIUM CHLORIDE, SODIUM LACTATE AND CALCIUM CHLORIDE: 600; 310; 30; 20 INJECTION, SOLUTION INTRAVENOUS at 22:07

## 2025-05-29 RX ADMIN — FENTANYL CITRATE 100 MCG: 50 INJECTION, SOLUTION INTRAMUSCULAR; INTRAVENOUS at 04:45

## 2025-05-29 RX ADMIN — Medication 25 MG: at 07:30

## 2025-05-29 RX ADMIN — DIPHENOXYLATE HYDROCHLORIDE AND ATROPINE SULFATE 2 TABLET: 2.5; .025 TABLET ORAL at 09:00

## 2025-05-29 RX ADMIN — MAGNESIUM SULFATE HEPTAHYDRATE 4 G: 40 INJECTION, SOLUTION INTRAVENOUS at 09:34

## 2025-05-29 RX ADMIN — SODIUM CITRATE AND CITRIC ACID MONOHYDRATE 30 ML: 2004; 3000 SOLUTION ORAL at 07:00

## 2025-05-29 RX ADMIN — ACETAMINOPHEN 1000 MG: 500 TABLET ORAL at 11:56

## 2025-05-29 RX ADMIN — KETOROLAC TROMETHAMINE 15 MG: 15 INJECTION, SOLUTION INTRAMUSCULAR; INTRAVENOUS at 08:43

## 2025-05-29 RX ADMIN — AZITHROMYCIN 500 MG: 500 INJECTION, POWDER, LYOPHILIZED, FOR SOLUTION INTRAVENOUS at 06:57

## 2025-05-29 RX ADMIN — CEFAZOLIN 2 G: 1 INJECTION, POWDER, FOR SOLUTION INTRAMUSCULAR; INTRAVENOUS at 07:07

## 2025-05-29 RX ADMIN — LIDOCAINE HYDROCHLORIDE AND EPINEPHRINE 5 ML: 20; 5 INJECTION, SOLUTION EPIDURAL; INFILTRATION; INTRACAUDAL; PERINEURAL at 07:08

## 2025-05-29 RX ADMIN — OXYCODONE HYDROCHLORIDE 10 MG: 10 TABLET ORAL at 20:21

## 2025-05-29 RX ADMIN — ENOXAPARIN SODIUM 40 MG: 100 INJECTION SUBCUTANEOUS at 20:15

## 2025-05-29 RX ADMIN — OXYTOCIN 30 UNITS: 10 INJECTION, SOLUTION INTRAMUSCULAR; INTRAVENOUS at 07:37

## 2025-05-29 RX ADMIN — OXYCODONE HYDROCHLORIDE 10 MG: 5 SOLUTION ORAL at 08:31

## 2025-05-29 RX ADMIN — KETOROLAC TROMETHAMINE 15 MG: 15 INJECTION, SOLUTION INTRAMUSCULAR; INTRAVENOUS at 15:04

## 2025-05-29 RX ADMIN — METOCLOPRAMIDE 10 MG: 5 INJECTION, SOLUTION INTRAMUSCULAR; INTRAVENOUS at 07:00

## 2025-05-29 RX ADMIN — SODIUM CHLORIDE, POTASSIUM CHLORIDE, SODIUM LACTATE AND CALCIUM CHLORIDE: 600; 310; 30; 20 INJECTION, SOLUTION INTRAVENOUS at 07:07

## 2025-05-29 RX ADMIN — KETOROLAC TROMETHAMINE 15 MG: 15 INJECTION, SOLUTION INTRAMUSCULAR; INTRAVENOUS at 20:15

## 2025-05-29 RX ADMIN — HYDROMORPHONE HYDROCHLORIDE 0.4 MG: 1 INJECTION, SOLUTION INTRAMUSCULAR; INTRAVENOUS; SUBCUTANEOUS at 10:48

## 2025-05-29 RX ADMIN — CARBOPROST TROMETHAMINE 250 MCG: 250 INJECTION, SOLUTION INTRAMUSCULAR at 07:38

## 2025-05-29 RX ADMIN — NIFEDIPINE 10 MG: 10 CAPSULE ORAL at 17:10

## 2025-05-29 ASSESSMENT — PAIN DESCRIPTION - PAIN TYPE
TYPE: SURGICAL PAIN
TYPE: ACUTE PAIN

## 2025-05-29 NOTE — PROGRESS NOTES
175 - Report received from DARNELL Pelayo RN. Care assumed. Patient laying in bed. Patient stating she would like a  section.      - Dr. Granado notified that patient is requesting  section for maternal fatigue.  Dr. Granado to discuss POC with patient.      - Dr. Granado and Denise GAUTHIER CNM at bedside to discuss POC. Patient agreeable at this time. Cooks balloon removed. SVE per flowsheet.      - Dr. Molina notified that patient is c/o pain that is not being covered by her bolus button.  See MAR for new orders.     221 - Dr. Molina at bedside for epidural bolus.     0020 - Denise REYM notified of dependent facial swelling on one side of patients face, no other deficits noted at this time. Patient repositioned.  Denise VILLAVICENCIO also notified of urine output and pitocin rate. No new orders at this time.    0318 - Patient c/o nausea, see MAR for orders. Orders received to check patient.    0333 - Denise updated on SVE. No new orders at this time.     0337 - Dr. Martínez notified that patient is feeling pain that is not being covered by epidural bolus button. Per Dr. Martínez, patient is maxed out on bolus from anesthesia. Orders received to supplement with fentanyl.     0431 - Denise updated on SVE re-check and notified that anesthesia has allowed for fentanyl administration with epidural. See MAR for orders.    0450 - Denise updated on low urine output. No new orders at this time.     0605 - Dr. Granado and Denise notified that patient is requesting  section. Plan for Denise to place IUPC.     0611 - Orders received from Denise to continue titrating pitocin. See MAR for new pitocin orders.     0630 - Dr. Granado notified that patient would like to speak to a physician.     0638 - Dr. Granado and Denise at bedside for POC discussion. Orders received for  section.     0705 - Report given to DARNELL Ureña RN. Care relinquished.

## 2025-05-29 NOTE — PROGRESS NOTES
1600- pt to floor. Oriented to room and policies with  services in use. POC discussed including feeding intervals, I+O documentation, latch support, lochia changes, ambulation, infant temperature management including STS and layering, weights, VS intervals, and discharge planning. Medications and other comfort measures discussed. Call light in reach. Magnesium sulfate and LR running per mar. Q1h VS and q2h DTRs in place. S/sx of preeclampsia discussed. Pt will call if any symptoms arise. BPs elevated with PP levels 150-160's. See flowsheet.     1630- MD notified by JESSICA Underwood of elevated BPs.     1700- MD notified of continued elevated BPs, New orders received.     1755- MD Nazario notified of continued elevated BPs.

## 2025-05-29 NOTE — PROGRESS NOTES
S: In to evaluate FHR tracing. Pt is agreeable to IUPC..      O:    Vitals:    05/29/25 0418 05/29/25 0430 05/29/25 0445 05/29/25 0500   BP:  136/85 (!) 144/95 (!) 141/84   Pulse:  90 90 88   Resp:       Temp: 36.9 °C (98.5 °F)      TempSrc: Oral      SpO2:       Weight:       Height:               FHTs:  Baseline 135, - accels, - decels, moderate variability/ minimal variability with sleep cycles        [place]: Contractions q 2-4 minutes, firm to palpation, pitocin @ 20 milliunits        SVE: 7/90/-1 llight meconium noted    A/P:  1.  IUP @ 40w0d            2.  Cat 2 FHTs             3.  S/P IUPC              4.  Review of tracing with Dr. Granado, Continue with pitocin    Denise Block, KRISSYM, APRN

## 2025-05-29 NOTE — PROGRESS NOTES
0700-Bedside report received from Aide LOPEZ. All questions have been answered at this time, care assumed. This RN to transfer pt to OR 1.    0724-P C/S delivery of viable baby boy, APGARs 7/9, gases sent.    0850-This RN telephoned Dr. Jacobs and Dr. Nazario and updated on pt status/VS, orders received, see orders for details.    0919-This RN telephoned Dr. Jacobs and updated on pt status/BP/DTR, POC discussed, mag orders received, see orders for details.    0945-This RN updated Dr. Nazario on pt status/BP/DTR/clonus, POC discussed.     1204-This RN telephoned Dr. Jacobs and updated on pt status/BP, orders to transfer pt to antepartum unit for obs.    1300-This RN at bedside, pt and FOB educated on need to feed baby every 2-3 hours, to notify this RN of any big gushes of blood, and IS, pt given bassinet with diapers and wipes and instructed to call out to ensure baby is swaddled when not skin to skin, pt and FOB verbalized understanding at this time.  in use at bedside.    1417-This RN telephoned Dr. Jacobs and updated on pt status/BP, orders to move pt to postpartum unit at this time.     1540-Bedside report given to Steph LOPEZ. POC discussed, fundal rub performed, bands verified x2. All questions have been answered at this time, care relinquished.

## 2025-05-29 NOTE — PROGRESS NOTES
"S: Patient feeling pain with contractions.  She says she cannot do this anymore and requests  delivery.    O:/78   Pulse 79   Temp 36.9 °C (98.5 °F) (Oral)   Resp 17   Ht 1.6 m (5' 3\")   Wt 87.5 kg (193 lb)   SpO2 97%      IUPC - q 2-3 min, inadequate MVUs  FHTs - 130s, moderate variability, + accels, occasional early decelerations.  There are periods of minimal variability during sleep cycles, which resolved spontaneously.  Cx - 7cm/90%/-1 per midwife    Pit at 20 mu/min    A/P: 39-year-old G1, P0 at 40-0/7 weeks admitted for induction of labor due to preeclampsia without severe features.  Patient has been 7 cm for the past  6 hours.  IUPC placed, and contraction pattern noted to be inadequate.     Patient is having pain with contractions despite epidurals, says she is tired, and says she cannot do this anymore and desires  delivery.    Discussed with the patient indications for  delivery. The patient voiced understanding of indications for  section at this time.    Discussed with the patient the risks of  delivery. The risks include bleeding, infection, transfusion, emergency hysterectomy to control bleeding, damage to surrounding organs (bowel, bladder, ureters, nerves, vessels), need for repair or future surgery, fetal injury, unexpected pathology, anesthesia risks, and rarely death.  I also discussed with the patient the risk of wound infection, wound breakdown, and scarring. We discussed that these risks are greater in people that have diabetes or obesity.    The patient had the opportunity to ask questions regarding the procedure. All questions answered to the patient's satisfaction. Plan to proceed with  delivery.    "

## 2025-05-29 NOTE — OP REPORT
PREOPERATIVE DIAGNOSIS:  IUP at 40-0/7 weeks  Preeclampsia without severe features  Advanced maternal age  Maternal request for     POSTOPERATIVE DIAGNOSIS:  Same    PROCEDURE: Primary low Transverse  Section via Pfannensteil    SURGEON: Keena Granado MD    ASSISTANT: Denise Block CNM    ANESTHESIA: Epidural followed by IV sedation    COMPLICATIONS: none    EBL: 600 cc    FLUIDS: 1400 cc LR    URINE OUTPUT: 50 cc, slightly bloodstained prior to procedure    INDICATIONS: 39-year-old G1, P0 at 40-0/7 weeks admitted for induction of labor due to preeclampsia without severe features.  She had previously been admitted for induction of labor 1 week earlier and due to AMA status, but received 3 doses of Cytotec without cervical change, and decided to go home.  She received Cervidil for cervical ripening followed by Cook's catheter with Pitocin, then AROM with Pitocin induction of labor.  The patient was very uncomfortable despite functioning epidural.  She made it to 7 cm dilated, but contractions were noted to be in adequate.  At this time, patient states she was tired and wanted to proceed with  delivery    FINDINGS: Viable male infant in cephalic presentation with corneum stained fluid, apgars 7 and 9, weight 3300 grams.. Normal uterus, tubes, ovaries.     PROCEDURE IN DETAIL: The patient was taken to the operating room where epidural anesthesia was found to be adequate.  She was then prepped and draped in the normal sterile fashion in the dorsal supine position with a leftward hip tilt.  A Pfannenstiel skin incision was then made with the scalpel and carried through to the underlying layer of fascia, which was incised in the midline and the incision was extended laterally with the Soriano scissors.  The superior aspect of the fascial incision was then grasped with Kocher clamps, elevated, and the underlying rectus muscles dissected off bluntly and sharply.  Attention was then turned to the  inferior aspect of this incision which, in a similar fashion, was grasped, tented up with Kocher clamps, and the rectus muscle was dissected off bluntly and sharply.  The rectus muscles were then  in the midline, and the peritoneum was identified, tented up, and entered sharply with the Metzenbaum scissors.  The patient became very uncomfortable with manipulation of the peritoneum, and at this point IV sedation was administered.  The peritoneal incision was then extended superiorly and inferiorly with good visualization of the bladder.  An Jesse O retractor was placed.  The lower uterine segment incised in a transverse fashion with the scalpel.  The uterine incision was then extended in a cephalo-caudad fashion with fingers.  The infant's head was delivered atraumatically.  The remainder of the infant was delivered without difficulty.  The infant was stimulated and the nose and mouth were bulb suctioned on the field.  The cord was clamped and cut after approximately 30 seconds.  The infant was then handed off to the awaiting attendant.  Gases were obtained.  The placenta was then removed manually, the uterus exteriorized, and cleared of all clot and debris.  The uterine incision was repaired with 1-0 chromic in a running, locked fashion.  Atony was noted.  In addition to Pitocin, 1 g of TXA was administered IV, and 1 amp of Hemabate administered IM.  Uterine massage performed.  Cytotec 400 mcg administered buccally.  Uterine tone improved and excellent hemostasis was noted.  The uterus was returned to the abdomen and the gutters were cleared of all clot and debris.  The fascia was approximated with 0 Vicryl in a running fashion.  The subcuticular fat was irrigated.  Jayme's fascia was closed with interrupted sutures of 2-0 Vicryl.  The skin was closed with staples .The patient tolerated the procedure well.  Sponge lap and needle counts were correct x3.  The patient was taken to the recovery room in stable  condition.

## 2025-05-29 NOTE — ANESTHESIA TIME REPORT
Anesthesia Start and Stop Event Times       Date Time Event    5/28/2025 0255 Anesthesia Start    5/29/2025 0700 Ready for Procedure     0806 Anesthesia Stop          Responsible Staff  05/28/25 to 05/29/25      Name Role Begin End    Alayna Smith M.D. Anesth 0255 0657    Oneil Molina M.D. Anesth 0657 0707    Renetta Robles D.O. Anesth 0707 0806          Overtime Reason:  no overtime (within assigned shift)    Comments:

## 2025-05-29 NOTE — PROGRESS NOTES
S: At bedside with Dr. Granado, pt is asking for C/S d/t pressure and pain. MD discussed recommendation for continuance of IOL versus C/S. POC discussed. Pt agreeable for balloon removal and SVE. Prior to making decision of C/S..      O:    Vitals:    05/28/25 1830 05/28/25 1845 05/28/25 1900 05/28/25 1930   BP: (!) 154/90 (!) 148/91 135/86 (!) 144/90   Pulse: 95 88 96 86   Resp:       Temp:       TempSrc:       SpO2:       Weight:       Height:               FHTs:  Baseline 135, + accels, - decels, moderate variability        [place]: Contractions q 3-4 minutes, firm to palpation, pitocin @ 10 milliunits         Balloon removed         SVE: 5-6/80/-2     A/P:  1.  IUP @ 39w6d            2.  Cat 1 FHTs             3.  Continue with pitocin             4.  Plan for AROM when pt comfortable    Denise Block, MAYE, APRN

## 2025-05-29 NOTE — PROGRESS NOTES
Called to room due to patient requesting  delivery.  She states she is uncomfortable, and feeling pressure in her vagina despite epidural.  She has a Cook's catheter in place with 80 cc in both balloons.    She is a 39-year-old G1, P0 at 39-6/7 weeks who was admitted for induction of labor secondary to preeclampsia without severe features.  She has received cervical ripening with Cervidil prior to the Cook's catheter with Pitocin.    We discussed that induction of labor with vaginal delivery would be the safest for her and her baby.  We discussed that  delivery has increased risks compared to vaginal delivery and also a longer recovery time, and there is no current indication for for  delivery.    I recommended we deflate and remove the Cook's catheter, as pressure is her main complaint.  We will attempt to rupture her membranes and proceed with induction if she finds the pressure to be improved.  If the cervix is still unfavorable despite Pitocin and ripening with a Cook's balloon, it may be reasonable to consider  for failed induction at that time.  The patient agrees with the current plan.

## 2025-05-29 NOTE — ANESTHESIA POSTPROCEDURE EVALUATION
Patient: Lina Lazo    Procedure Summary       Date: 25 Room / Location: [facility] OR 01 / SURGERY LABOR AND DELIVERY    Anesthesia Start: 255 Anesthesia Stop: 25    Procedure:  SECTION, PRIMARY (Abdomen) Diagnosis: (ARREST OF DILATION)    Surgeons: Keena Granado M.D. Responsible Provider: Renetta Robles D.O.    Anesthesia Type: epidural ASA Status: 2            Final Anesthesia Type: epidural  Last vitals  BP   Blood Pressure: (!) 153/82    Temp   36.9 °C (98.5 °F)    Pulse   82   Resp   17    SpO2   97 %      Anesthesia Post Evaluation    Patient location during evaluation: PACU  Patient participation: complete - patient participated  Level of consciousness: sleepy but conscious    Airway patency: patent  Anesthetic complications: no  Cardiovascular status: hemodynamically stable  Respiratory status: acceptable  Hydration status: euvolemic    PONV: none          No notable events documented.     Nurse Pain Score: 3 (NPRS)

## 2025-05-30 ENCOUNTER — TELEPHONE (OUTPATIENT)
Dept: OBGYN | Facility: CLINIC | Age: 40
End: 2025-05-30
Payer: MEDICAID

## 2025-05-30 LAB
ALBUMIN SERPL BCP-MCNC: 2.4 G/DL (ref 3.2–4.9)
ALBUMIN/GLOB SERPL: 0.9 G/DL
ALP SERPL-CCNC: 159 U/L (ref 30–99)
ALT SERPL-CCNC: 47 U/L (ref 2–50)
ANION GAP SERPL CALC-SCNC: 10 MMOL/L (ref 7–16)
AST SERPL-CCNC: 35 U/L (ref 12–45)
BILIRUB SERPL-MCNC: 0.3 MG/DL (ref 0.1–1.5)
BUN SERPL-MCNC: 16 MG/DL (ref 8–22)
CALCIUM ALBUM COR SERPL-MCNC: 9.1 MG/DL (ref 8.5–10.5)
CALCIUM SERPL-MCNC: 7.8 MG/DL (ref 8.5–10.5)
CHLORIDE SERPL-SCNC: 106 MMOL/L (ref 96–112)
CO2 SERPL-SCNC: 19 MMOL/L (ref 20–33)
CREAT SERPL-MCNC: 1.03 MG/DL (ref 0.5–1.4)
ERYTHROCYTE [DISTWIDTH] IN BLOOD BY AUTOMATED COUNT: 44.9 FL (ref 35.9–50)
GFR SERPLBLD CREATININE-BSD FMLA CKD-EPI: 72 ML/MIN/1.73 M 2
GLOBULIN SER CALC-MCNC: 2.6 G/DL (ref 1.9–3.5)
GLUCOSE SERPL-MCNC: 110 MG/DL (ref 65–99)
HCT VFR BLD AUTO: 30.5 % (ref 37–47)
HGB BLD-MCNC: 10.5 G/DL (ref 12–16)
MAGNESIUM SERPL-MCNC: 7.9 MG/DL (ref 1.5–2.5)
MCH RBC QN AUTO: 32.8 PG (ref 27–33)
MCHC RBC AUTO-ENTMCNC: 34.4 G/DL (ref 32.2–35.5)
MCV RBC AUTO: 95.3 FL (ref 81.4–97.8)
PLATELET # BLD AUTO: 115 K/UL (ref 164–446)
PMV BLD AUTO: 12.3 FL (ref 9–12.9)
POTASSIUM SERPL-SCNC: 4.3 MMOL/L (ref 3.6–5.5)
PROT SERPL-MCNC: 5 G/DL (ref 6–8.2)
RBC # BLD AUTO: 3.2 M/UL (ref 4.2–5.4)
SODIUM SERPL-SCNC: 135 MMOL/L (ref 135–145)
WBC # BLD AUTO: 18.5 K/UL (ref 4.8–10.8)

## 2025-05-30 PROCEDURE — 700111 HCHG RX REV CODE 636 W/ 250 OVERRIDE (IP): Performed by: OBSTETRICS & GYNECOLOGY

## 2025-05-30 PROCEDURE — 700111 HCHG RX REV CODE 636 W/ 250 OVERRIDE (IP): Mod: JZ | Performed by: OBSTETRICS & GYNECOLOGY

## 2025-05-30 PROCEDURE — A9270 NON-COVERED ITEM OR SERVICE: HCPCS | Performed by: OBSTETRICS & GYNECOLOGY

## 2025-05-30 PROCEDURE — 700111 HCHG RX REV CODE 636 W/ 250 OVERRIDE (IP): Mod: JZ | Performed by: STUDENT IN AN ORGANIZED HEALTH CARE EDUCATION/TRAINING PROGRAM

## 2025-05-30 PROCEDURE — 80053 COMPREHEN METABOLIC PANEL: CPT

## 2025-05-30 PROCEDURE — 700102 HCHG RX REV CODE 250 W/ 637 OVERRIDE(OP): Performed by: STUDENT IN AN ORGANIZED HEALTH CARE EDUCATION/TRAINING PROGRAM

## 2025-05-30 PROCEDURE — 36415 COLL VENOUS BLD VENIPUNCTURE: CPT

## 2025-05-30 PROCEDURE — 700102 HCHG RX REV CODE 250 W/ 637 OVERRIDE(OP): Performed by: OBSTETRICS & GYNECOLOGY

## 2025-05-30 PROCEDURE — 85027 COMPLETE CBC AUTOMATED: CPT

## 2025-05-30 PROCEDURE — A9270 NON-COVERED ITEM OR SERVICE: HCPCS | Performed by: STUDENT IN AN ORGANIZED HEALTH CARE EDUCATION/TRAINING PROGRAM

## 2025-05-30 PROCEDURE — 83735 ASSAY OF MAGNESIUM: CPT

## 2025-05-30 PROCEDURE — 770002 HCHG ROOM/CARE - OB PRIVATE (112)

## 2025-05-30 RX ORDER — OXYCODONE HYDROCHLORIDE 10 MG/1
10 TABLET ORAL EVERY 4 HOURS PRN
Refills: 0 | Status: DISCONTINUED | OUTPATIENT
Start: 2025-05-30 | End: 2025-06-01 | Stop reason: HOSPADM

## 2025-05-30 RX ORDER — OXYCODONE HYDROCHLORIDE 5 MG/1
5 TABLET ORAL EVERY 4 HOURS PRN
Refills: 0 | Status: DISCONTINUED | OUTPATIENT
Start: 2025-05-30 | End: 2025-06-01 | Stop reason: HOSPADM

## 2025-05-30 RX ADMIN — SIMETHICONE 125 MG: 125 TABLET, CHEWABLE ORAL at 22:34

## 2025-05-30 RX ADMIN — DOCUSATE SODIUM 100 MG: 100 CAPSULE, LIQUID FILLED ORAL at 10:12

## 2025-05-30 RX ADMIN — SIMETHICONE 125 MG: 125 TABLET, CHEWABLE ORAL at 14:39

## 2025-05-30 RX ADMIN — ENOXAPARIN SODIUM 40 MG: 100 INJECTION SUBCUTANEOUS at 18:30

## 2025-05-30 RX ADMIN — ACETAMINOPHEN 1000 MG: 500 TABLET ORAL at 18:29

## 2025-05-30 RX ADMIN — MAGNESIUM SULFATE IN WATER 2 G/HR: 40 INJECTION, SOLUTION INTRAVENOUS at 05:38

## 2025-05-30 RX ADMIN — OXYCODONE 5 MG: 5 TABLET ORAL at 22:34

## 2025-05-30 RX ADMIN — LABETALOL HYDROCHLORIDE 200 MG: 100 TABLET, FILM COATED ORAL at 05:20

## 2025-05-30 RX ADMIN — LABETALOL HYDROCHLORIDE 200 MG: 100 TABLET, FILM COATED ORAL at 18:30

## 2025-05-30 RX ADMIN — IBUPROFEN 800 MG: 800 TABLET, FILM COATED ORAL at 22:05

## 2025-05-30 RX ADMIN — ACETAMINOPHEN 1000 MG: 500 TABLET ORAL at 11:01

## 2025-05-30 RX ADMIN — OXYCODONE HYDROCHLORIDE 10 MG: 10 TABLET ORAL at 14:38

## 2025-05-30 RX ADMIN — IBUPROFEN 800 MG: 800 TABLET, FILM COATED ORAL at 11:00

## 2025-05-30 RX ADMIN — PRENATAL WITH FERROUS FUM AND FOLIC ACID 1 TABLET: 3080; 920; 120; 400; 22; 1.84; 3; 20; 10; 1; 12; 200; 27; 25; 2 TABLET ORAL at 10:11

## 2025-05-30 RX ADMIN — ACETAMINOPHEN 1000 MG: 500 TABLET ORAL at 05:20

## 2025-05-30 RX ADMIN — OXYCODONE HYDROCHLORIDE 10 MG: 10 TABLET ORAL at 06:02

## 2025-05-30 RX ADMIN — KETOROLAC TROMETHAMINE 15 MG: 15 INJECTION, SOLUTION INTRAMUSCULAR; INTRAVENOUS at 02:55

## 2025-05-30 ASSESSMENT — PAIN DESCRIPTION - PAIN TYPE
TYPE: SURGICAL PAIN

## 2025-05-30 ASSESSMENT — EDINBURGH POSTNATAL DEPRESSION SCALE (EPDS)
I HAVE BEEN SO UNHAPPY THAT I HAVE BEEN CRYING: NO, NEVER
THE THOUGHT OF HARMING MYSELF HAS OCCURRED TO ME: NEVER
I HAVE BLAMED MYSELF UNNECESSARILY WHEN THINGS WENT WRONG: YES, SOME OF THE TIME
I HAVE BEEN SO UNHAPPY THAT I HAVE HAD DIFFICULTY SLEEPING: NOT AT ALL
I HAVE LOOKED FORWARD WITH ENJOYMENT TO THINGS: AS MUCH AS I EVER DID
I HAVE BEEN ABLE TO LAUGH AND SEE THE FUNNY SIDE OF THINGS: AS MUCH AS I ALWAYS COULD
I HAVE FELT SCARED OR PANICKY FOR NO GOOD REASON: NO, NOT AT ALL
I HAVE FELT SAD OR MISERABLE: NO, NOT AT ALL
I HAVE BEEN ANXIOUS OR WORRIED FOR NO GOOD REASON: HARDLY EVER
THINGS HAVE BEEN GETTING ON TOP OF ME: NO, I HAVE BEEN COPING AS WELL AS EVER

## 2025-05-30 NOTE — PROGRESS NOTES
Obstetrics & Gynecology Post-Delivery Progress Note    Date of Service      39 y.o.  s/p primary low transverse  section for maternal request, pre-e without severe features  Delivery date: 25    Events  Developed HELLP immediately postpartum. Blood pressures were severe range. Labetalol and Nifedipine initiated. Mag x24 hours. Blood pressure are stable. Patient reports intermittent blurry vision. Denies HA and epigastric pain.     Subjective  Pain: Yes,  controlled  Bleeding: lochia minimal  PO's: taking regular diet  Voiding: vázquez in place  Ambulating: no  Passing flatus: Yes  Feeding: breastfeeding well    Objective  Temp:  [36.1 °C (97 °F)-37 °C (98.6 °F)] 36.5 °C (97.7 °F)  Pulse:  [] 85  Resp:  [10-63] 20  BP: (113-166)/() 128/75  SpO2:  [92 %-100 %] 97 %    Physical Exam  General: well and resting  Chest/Breasts: nipples intact and breasts soft   Abdomen: nontender, normal bowel sounds, obese, soft, non-distended  Fundus: firm and at umbilicus  Incision: dressing clean, dry, intact  Perineum: deferred  Extremities: 1+ edema, calves nontender    Recent Labs     25  1345 25  0112 25  0927 25  1719 25  0500   WBC 9.6 10.1 21.6* 26.7* 18.5*   RBC 3.80* 3.94* 4.21 3.80* 3.20*   HEMOGLOBIN 12.4 12.6 13.9 12.3 10.5*   HEMATOCRIT 36.3* 37.2 39.9 36.2* 30.5*   MCV 95.5 94.4 94.8 95.3 95.3   MCH 32.6 32.0 33.0 32.4 32.8   RDW 45.8 45.1 45.8 45.3 44.9   PLATELETCT 117* 121* 101* 120* 115*   MPV 12.8 12.6 12.0 11.9 12.3   NEUTSPOLYS 67.40 64.40 89.40*  --   --    LYMPHOCYTES 22.00 25.60 5.40*  --   --    MONOCYTES 7.10 6.20 3.90  --   --    EOSINOPHILS 0.90 1.50 0.00  --   --    BASOPHILS 0.60 0.40 0.30  --   --         Admission on 2025   Component Date Value Ref Range Status    Sodium 2025 135  135 - 145 mmol/L Final    Potassium 2025 4.1  3.6 - 5.5 mmol/L Final    Comment: The hemolysis index of the specimen exceeds the allowed tolerance  for the  test. Result may be affected.?Specimen recollection is recommended to  confirm the result.      Chloride 05/27/2025 108  96 - 112 mmol/L Final    Co2 05/27/2025 16 (L)  20 - 33 mmol/L Final    Anion Gap 05/27/2025 11.0  7.0 - 16.0 Final    Glucose 05/27/2025 82  65 - 99 mg/dL Final    Bun 05/27/2025 18  8 - 22 mg/dL Final    Creatinine 05/27/2025 0.80  0.50 - 1.40 mg/dL Final    Calcium 05/27/2025 9.3  8.5 - 10.5 mg/dL Final    Correct Calcium 05/27/2025 10.1  8.5 - 10.5 mg/dL Final    AST(SGOT) 05/27/2025 35  12 - 45 U/L Final    ALT(SGPT) 05/27/2025 38  2 - 50 U/L Final    Alkaline Phosphatase 05/27/2025 200 (H)  30 - 99 U/L Final    Total Bilirubin 05/27/2025 0.2  0.1 - 1.5 mg/dL Final    Albumin 05/27/2025 3.0 (L)  3.2 - 4.9 g/dL Final    Total Protein 05/27/2025 6.0  6.0 - 8.2 g/dL Final    Globulin 05/27/2025 3.0  1.9 - 3.5 g/dL Final    A-G Ratio 05/27/2025 1.0  g/dL Final    Total Protein, Urine 05/27/2025 76.6 (H)  0.0 - 15.0 mg/dL Final    Creatinine, Random Urine 05/27/2025 47.00  mg/dL Final    Comment: Reference ranges have not been established for this specimen type.  Result interpretation should include consideration of patient's  medical condition and clinical presentation.      Protein Creatinine Ratio 05/27/2025 1630 (H)  10 - 107 mg/g Final    WBC 05/27/2025 9.6  4.8 - 10.8 K/uL Final    RBC 05/27/2025 3.80 (L)  4.20 - 5.40 M/uL Final    Hemoglobin 05/27/2025 12.4  12.0 - 16.0 g/dL Final    Hematocrit 05/27/2025 36.3 (L)  37.0 - 47.0 % Final    MCV 05/27/2025 95.5  81.4 - 97.8 fL Final    MCH 05/27/2025 32.6  27.0 - 33.0 pg Final    MCHC 05/27/2025 34.2  32.2 - 35.5 g/dL Final    RDW 05/27/2025 45.8  35.9 - 50.0 fL Final    Platelet Count 05/27/2025 117 (L)  164 - 446 K/uL Final    MPV 05/27/2025 12.8  9.0 - 12.9 fL Final    Neutrophils-Polys 05/27/2025 67.40  44.00 - 72.00 % Final    Lymphocytes 05/27/2025 22.00  22.00 - 41.00 % Final    Monocytes 05/27/2025 7.10  0.00 - 13.40 % Final     Eosinophils 05/27/2025 0.90  0.00 - 6.90 % Final    Basophils 05/27/2025 0.60  0.00 - 1.80 % Final    Immature Granulocytes 05/27/2025 2.00 (H)  0.00 - 0.90 % Final    Nucleated RBC 05/27/2025 0.00  0.00 - 0.20 /100 WBC Final    Neutrophils (Absolute) 05/27/2025 6.47  1.82 - 7.42 K/uL Final    Includes immature neutrophils, if present.    Lymphs (Absolute) 05/27/2025 2.11  1.00 - 4.80 K/uL Final    Monos (Absolute) 05/27/2025 0.68  0.00 - 0.85 K/uL Final    Eos (Absolute) 05/27/2025 0.09  0.00 - 0.51 K/uL Final    Baso (Absolute) 05/27/2025 0.06  0.00 - 0.12 K/uL Final    Immature Granulocytes (abs) 05/27/2025 0.19 (H)  0.00 - 0.11 K/uL Final    NRBC (Absolute) 05/27/2025 0.00  K/uL Final    Syphilis, Treponemal Qual 05/27/2025 Non-Reactive  Non-Reactive Final    Comment: Result of Non-reactive indicates no serologic evidence of  infection with Treponema pallidum.  (Incubating or early  primary syphilis cannot be excluded).      Holding Tube - Bb 05/27/2025 DONE   Final    GFR (CKD-EPI) 05/27/2025 97  >60 mL/min/1.73 m 2 Final    Comment: Estimated Glomerular Filtration Rate is calculated using  race neutral CKD-EPI 2021 equation per NKF-ASN recommendations.      WBC 05/28/2025 10.1  4.8 - 10.8 K/uL Final    RBC 05/28/2025 3.94 (L)  4.20 - 5.40 M/uL Final    Hemoglobin 05/28/2025 12.6  12.0 - 16.0 g/dL Final    Hematocrit 05/28/2025 37.2  37.0 - 47.0 % Final    MCV 05/28/2025 94.4  81.4 - 97.8 fL Final    MCH 05/28/2025 32.0  27.0 - 33.0 pg Final    MCHC 05/28/2025 33.9  32.2 - 35.5 g/dL Final    RDW 05/28/2025 45.1  35.9 - 50.0 fL Final    Platelet Count 05/28/2025 121 (L)  164 - 446 K/uL Final    MPV 05/28/2025 12.6  9.0 - 12.9 fL Final    Neutrophils-Polys 05/28/2025 64.40  44.00 - 72.00 % Final    Lymphocytes 05/28/2025 25.60  22.00 - 41.00 % Final    Monocytes 05/28/2025 6.20  0.00 - 13.40 % Final    Eosinophils 05/28/2025 1.50  0.00 - 6.90 % Final    Basophils 05/28/2025 0.40  0.00 - 1.80 % Final    Immature  Granulocytes 05/28/2025 1.90 (H)  0.00 - 0.90 % Final    Nucleated RBC 05/28/2025 0.20  0.00 - 0.20 /100 WBC Final    Neutrophils (Absolute) 05/28/2025 6.50  1.82 - 7.42 K/uL Final    Includes immature neutrophils, if present.    Lymphs (Absolute) 05/28/2025 2.58  1.00 - 4.80 K/uL Final    Monos (Absolute) 05/28/2025 0.62  0.00 - 0.85 K/uL Final    Eos (Absolute) 05/28/2025 0.15  0.00 - 0.51 K/uL Final    Baso (Absolute) 05/28/2025 0.04  0.00 - 0.12 K/uL Final    Immature Granulocytes (abs) 05/28/2025 0.19 (H)  0.00 - 0.11 K/uL Final    NRBC (Absolute) 05/28/2025 0.02  K/uL Final    WBC 05/29/2025 21.6 (H)  4.8 - 10.8 K/uL Final    RBC 05/29/2025 4.21  4.20 - 5.40 M/uL Final    Hemoglobin 05/29/2025 13.9  12.0 - 16.0 g/dL Final    Hematocrit 05/29/2025 39.9  37.0 - 47.0 % Final    MCV 05/29/2025 94.8  81.4 - 97.8 fL Final    MCH 05/29/2025 33.0  27.0 - 33.0 pg Final    MCHC 05/29/2025 34.8  32.2 - 35.5 g/dL Final    RDW 05/29/2025 45.8  35.9 - 50.0 fL Final    Platelet Count 05/29/2025 101 (L)  164 - 446 K/uL Final    MPV 05/29/2025 12.0  9.0 - 12.9 fL Final    Neutrophils-Polys 05/29/2025 89.40 (H)  44.00 - 72.00 % Final    Lymphocytes 05/29/2025 5.40 (L)  22.00 - 41.00 % Final    Monocytes 05/29/2025 3.90  0.00 - 13.40 % Final    Eosinophils 05/29/2025 0.00  0.00 - 6.90 % Final    Basophils 05/29/2025 0.30  0.00 - 1.80 % Final    Immature Granulocytes 05/29/2025 1.00 (H)  0.00 - 0.90 % Final    Nucleated RBC 05/29/2025 0.00  0.00 - 0.20 /100 WBC Final    Neutrophils (Absolute) 05/29/2025 19.33 (H)  1.82 - 7.42 K/uL Final    Includes immature neutrophils, if present.    Lymphs (Absolute) 05/29/2025 1.16  1.00 - 4.80 K/uL Final    Monos (Absolute) 05/29/2025 0.84  0.00 - 0.85 K/uL Final    Eos (Absolute) 05/29/2025 0.01  0.00 - 0.51 K/uL Final    Baso (Absolute) 05/29/2025 0.06  0.00 - 0.12 K/uL Final    Immature Granulocytes (abs) 05/29/2025 0.22 (H)  0.00 - 0.11 K/uL Final    NRBC (Absolute) 05/29/2025 0.00  K/uL  Final    Syphilis, Treponemal Qual 05/29/2025 Non-Reactive  Non-Reactive Final    Comment: Result of Non-reactive indicates no serologic evidence of  infection with Treponema pallidum.  (Incubating or early  primary syphilis cannot be excluded).      Sodium 05/29/2025 132 (L)  135 - 145 mmol/L Final    Potassium 05/29/2025 4.2  3.6 - 5.5 mmol/L Final    Chloride 05/29/2025 105  96 - 112 mmol/L Final    Co2 05/29/2025 15 (L)  20 - 33 mmol/L Final    Anion Gap 05/29/2025 12.0  7.0 - 16.0 Final    Glucose 05/29/2025 89  65 - 99 mg/dL Final    Bun 05/29/2025 21  8 - 22 mg/dL Final    Creatinine 05/29/2025 1.70 (H)  0.50 - 1.40 mg/dL Final    Calcium 05/29/2025 8.0 (L)  8.5 - 10.5 mg/dL Final    Correct Calcium 05/29/2025 9.1  8.5 - 10.5 mg/dL Final    AST(SGOT) 05/29/2025 69 (H)  12 - 45 U/L Final    ALT(SGPT) 05/29/2025 75 (H)  2 - 50 U/L Final    Alkaline Phosphatase 05/29/2025 211 (H)  30 - 99 U/L Final    Total Bilirubin 05/29/2025 0.6  0.1 - 1.5 mg/dL Final    Albumin 05/29/2025 2.6 (L)  3.2 - 4.9 g/dL Final    Total Protein 05/29/2025 5.6 (L)  6.0 - 8.2 g/dL Final    Globulin 05/29/2025 3.0  1.9 - 3.5 g/dL Final    A-G Ratio 05/29/2025 0.9  g/dL Final    GFR (CKD-EPI) 05/29/2025 39 (A)  >60 mL/min/1.73 m 2 Final    Comment: Estimated Glomerular Filtration Rate is calculated using  race neutral CKD-EPI 2021 equation per NKF-ASN recommendations.      Magnesium 05/29/2025 3.8 (H)  1.5 - 2.5 mg/dL Final    Magnesium 05/29/2025 4.1 (H)  1.5 - 2.5 mg/dL Final    WBC 05/29/2025 26.7 (H)  4.8 - 10.8 K/uL Final    RBC 05/29/2025 3.80 (L)  4.20 - 5.40 M/uL Final    Hemoglobin 05/29/2025 12.3  12.0 - 16.0 g/dL Final    Hematocrit 05/29/2025 36.2 (L)  37.0 - 47.0 % Final    MCV 05/29/2025 95.3  81.4 - 97.8 fL Final    MCH 05/29/2025 32.4  27.0 - 33.0 pg Final    MCHC 05/29/2025 34.0  32.2 - 35.5 g/dL Final    RDW 05/29/2025 45.3  35.9 - 50.0 fL Final    Platelet Count 05/29/2025 120 (L)  164 - 446 K/uL Final    MPV 05/29/2025  11.9  9.0 - 12.9 fL Final    Magnesium 05/29/2025 6.3 (H)  1.5 - 2.5 mg/dL Final    Results obtained by dilution.    Magnesium 05/29/2025 7.3 (H)  1.5 - 2.5 mg/dL Final    Results obtained by dilution.    WBC 05/30/2025 18.5 (H)  4.8 - 10.8 K/uL Final    RBC 05/30/2025 3.20 (L)  4.20 - 5.40 M/uL Final    Hemoglobin 05/30/2025 10.5 (L)  12.0 - 16.0 g/dL Final    Hematocrit 05/30/2025 30.5 (L)  37.0 - 47.0 % Final    MCV 05/30/2025 95.3  81.4 - 97.8 fL Final    MCH 05/30/2025 32.8  27.0 - 33.0 pg Final    MCHC 05/30/2025 34.4  32.2 - 35.5 g/dL Final    RDW 05/30/2025 44.9  35.9 - 50.0 fL Final    Platelet Count 05/30/2025 115 (L)  164 - 446 K/uL Final    MPV 05/30/2025 12.3  9.0 - 12.9 fL Final    Sodium 05/30/2025 135  135 - 145 mmol/L Final    Potassium 05/30/2025 4.3  3.6 - 5.5 mmol/L Final    Chloride 05/30/2025 106  96 - 112 mmol/L Final    Co2 05/30/2025 19 (L)  20 - 33 mmol/L Final    Anion Gap 05/30/2025 10.0  7.0 - 16.0 Final    Glucose 05/30/2025 110 (H)  65 - 99 mg/dL Final    Bun 05/30/2025 16  8 - 22 mg/dL Final    Creatinine 05/30/2025 1.03  0.50 - 1.40 mg/dL Final    Calcium 05/30/2025 7.8 (L)  8.5 - 10.5 mg/dL Final    Correct Calcium 05/30/2025 9.1  8.5 - 10.5 mg/dL Final    AST(SGOT) 05/30/2025 35  12 - 45 U/L Final    ALT(SGPT) 05/30/2025 47  2 - 50 U/L Final    Alkaline Phosphatase 05/30/2025 159 (H)  30 - 99 U/L Final    Total Bilirubin 05/30/2025 0.3  0.1 - 1.5 mg/dL Final    Albumin 05/30/2025 2.4 (L)  3.2 - 4.9 g/dL Final    Total Protein 05/30/2025 5.0 (L)  6.0 - 8.2 g/dL Final    Globulin 05/30/2025 2.6  1.9 - 3.5 g/dL Final    A-G Ratio 05/30/2025 0.9  g/dL Final    Magnesium 05/30/2025 7.9 (H)  1.5 - 2.5 mg/dL Final    Results obtained by dilution.    GFR (CKD-EPI) 05/30/2025 72  >60 mL/min/1.73 m 2 Final    Comment: Estimated Glomerular Filtration Rate is calculated using  race neutral CKD-EPI 2021 equation per NKF-ASN recommendations.        Assessment/Plan  Lina Griffin  Arroyo is a 39 y.o.yo  s/p postop day #2  s/p  for maternal request     1. Post care: will work on continued pain management, voiding after vázquez is discontinued when mag is discontinued.  2. Hemodynamics: stable.   3. Plan to decrease magnesium dose due to blurry vision and Mg level of 7.9, per Dr. Izaguirre  3. Pain: controlled  4. Rh+, Rubella Immune  5. Method of Feeding: plans to breastfeed  6. Method of Contraception: undecided  7. Disposition: likely home postop day 4    VTE prophylaxis: lovenox 40mg subQ daily and SCDs

## 2025-05-30 NOTE — CARE PLAN
The patient is Stable - Low risk of patient condition declining or worsening    Shift Goals  Clinical Goals: BP stable; pain control  Patient Goals: pain WDL  Family Goals: bond    Progress made toward(s) clinical / shift goals:  BP controlled through scheduled antihypertensive medication administration. Pain controlled through PRN/scheduled pain medication administrations.    Patient is not progressing towards the following goals:

## 2025-05-30 NOTE — CARE PLAN
The patient is Stable - Low risk of patient condition declining or worsening    Shift Goals  Clinical Goals: remain clinically stable  Patient Goals: pain WDL  Family Goals: bond    Progress made toward(s) clinical / shift goals:  Patient will ask for pain medication when needed.  Patient has scant to light lochia with a firm palpable uterus.  Vital signs are within defined limits.  Assessment will continue.     Patient is not progressing towards the following goals:

## 2025-05-30 NOTE — PROGRESS NOTES
1852: Received bedside report from day shift RNSteph. Greeted pt and SO at the bedside. Whiteboard updated. VS and urine output obtained. Reflexes and uterus assessed.     2008: Completed assessment. Pt complains of pain at this time. Pain medication was administered, see MAR. IV in place, no signs of phlebitis or infiltration. LR running at 75 mL/hr and Magnesium Sulfate 2g running at 50 mL/hr. POC discussed: future lab draw, VS checks, reflex assessment, pain control, lochia, hydration, and plan for ambulation and Andrade catheter removal at 24 hrs post-op. Reinforced education on emergency and non-emergency call light system, and bed safety.     Educated pt to press the red emergency button on the side rails if she experiences:  Sudden dizziness  Gushing of blood  Soaking a pad front and back within an hour  Passing a clot bigger than an egg size  Infant cyanotic  Pre-E s/sx (blurry vision, constant HA, right epigastric pain)    Pt verbalized understanding. Call light placed within reach.     Educated pt on the effects of Magnesium on the muscles and the purpose of Magnesium Sulfate. All questions answered.    Conversation interpreted by [email or web address] Lakesha #[id number].    2113: Updated Nai Goff CNM, of pt's morning GFR of 39 mL/min. Pt has had adequate urine output. This RN recommended that pt has a repeat CMP completed with her morning labs to determine trend of GFR. Nai agreed and placed CMP order for the AM.    2241: Notified Nai Goff CNM, of pt not having a continuous Magnesium draw order q 6 hrs while being administered Magnesium (which is per protocol). Per Nai, place order for pt's Magnesium level to be drawn q 6 hrs.     0009: Notified Nai of pt's Magnesium level nearing 8 mg/dL, current result of 7.3 mg/dL. Per Nai, no new orders placed.    0753: Gave bedside report to day shift RNOxana. Relinquished care at this time.

## 2025-05-30 NOTE — TELEPHONE ENCOUNTER
Left message for pt to call back, needs to schedule c-s ck and pp shaylee. Delivered 05/29/25. MD@8:20AM

## 2025-05-30 NOTE — CARE PLAN
The patient is Stable - Low risk of patient condition declining or worsening    Shift Goals  Clinical Goals: lochia WDL  Patient Goals: pain WDL  Family Goals: bond    Progress made toward(s) clinical / shift goals:      Problem: Risk for Infection and Impaired Wound Healing  Goal: Patient will remain free from infection  Outcome: Progressing  Note: Pt will remain afebrile, and will show no s/s of infection.     Problem: Pain  Goal: Patient's pain will be alleviated or reduced to the patient’s comfort goal  Outcome: Progressing  Note: Pt continuously monitored for pain, educated on pain management options. Call light within reach, pt understands to call for RN regarding any needs or concerns.

## 2025-05-30 NOTE — LACTATION NOTE
Initial Lactation Consultation:    Met with Lina and her new baby boy to provide lactation support. She reports breastfeeding to be going well. Infant has been cluster feeding since 0400. Lina's nipples remain intact and non-tender.    Infant presents with feeding cues. Lactation consultant assisted with latch onto right breast, using clutch hold. Latch sustained. Infant visualized to have rhythmic suck pattern at breast Audible swallows appreciated. Mother of baby denies pain with latch.     feeding patterns reviewed. Frequent skin-to-skin and cue-based feeding is encouraged; at least 8 feeds per 24 hours. Reviewed the milk making process, inclusive of supply and demand. Discussed signs of deep, asymmetric latch, and the importance of maintaining good latch to avoid pain/nipple damage and maximize milk transfer. Lina is to offer both breasts at each feeding, and baby should be allowed to self-limit time at breast.     Feeding plan:   Cue-based breastfeeding, at least once every three hours.    Lina is provided with the opportunity to ask questions. These have been answered to her satisfaction. She is encouraged to call RN/lactation for additional breastfeeding assistance, as needed, throughout remainder of hospital stay.       Encouraged follow up with [facility] for outpatient lactation support.     [facility] Breastfeeding Resource list  and [facility] booklet provided to patient.

## 2025-05-31 VITALS
HEIGHT: 63 IN | RESPIRATION RATE: 18 BRPM | DIASTOLIC BLOOD PRESSURE: 73 MMHG | WEIGHT: 193 LBS | BODY MASS INDEX: 34.2 KG/M2 | OXYGEN SATURATION: 97 % | TEMPERATURE: 99.6 F | HEART RATE: 100 BPM | SYSTOLIC BLOOD PRESSURE: 129 MMHG

## 2025-05-31 PROCEDURE — 770002 HCHG ROOM/CARE - OB PRIVATE (112)

## 2025-05-31 PROCEDURE — A9270 NON-COVERED ITEM OR SERVICE: HCPCS | Performed by: OBSTETRICS & GYNECOLOGY

## 2025-05-31 PROCEDURE — 700102 HCHG RX REV CODE 250 W/ 637 OVERRIDE(OP): Performed by: OBSTETRICS & GYNECOLOGY

## 2025-05-31 PROCEDURE — 700111 HCHG RX REV CODE 636 W/ 250 OVERRIDE (IP): Mod: JZ | Performed by: OBSTETRICS & GYNECOLOGY

## 2025-05-31 RX ADMIN — ACETAMINOPHEN 1000 MG: 500 TABLET ORAL at 00:29

## 2025-05-31 RX ADMIN — OXYCODONE HYDROCHLORIDE 10 MG: 10 TABLET ORAL at 21:12

## 2025-05-31 RX ADMIN — LABETALOL HYDROCHLORIDE 200 MG: 100 TABLET, FILM COATED ORAL at 17:11

## 2025-05-31 RX ADMIN — PRENATAL WITH FERROUS FUM AND FOLIC ACID 1 TABLET: 3080; 920; 120; 400; 22; 1.84; 3; 20; 10; 1; 12; 200; 27; 25; 2 TABLET ORAL at 08:11

## 2025-05-31 RX ADMIN — LABETALOL HYDROCHLORIDE 200 MG: 100 TABLET, FILM COATED ORAL at 06:22

## 2025-05-31 RX ADMIN — ACETAMINOPHEN 1000 MG: 500 TABLET ORAL at 11:07

## 2025-05-31 RX ADMIN — IBUPROFEN 800 MG: 800 TABLET, FILM COATED ORAL at 14:50

## 2025-05-31 RX ADMIN — ENOXAPARIN SODIUM 40 MG: 100 INJECTION SUBCUTANEOUS at 17:11

## 2025-05-31 RX ADMIN — IBUPROFEN 800 MG: 800 TABLET, FILM COATED ORAL at 22:19

## 2025-05-31 RX ADMIN — IBUPROFEN 800 MG: 800 TABLET, FILM COATED ORAL at 06:22

## 2025-05-31 RX ADMIN — ACETAMINOPHEN 1000 MG: 500 TABLET ORAL at 06:22

## 2025-05-31 ASSESSMENT — PAIN DESCRIPTION - PAIN TYPE
TYPE: ACUTE PAIN
TYPE: ACUTE PAIN;SURGICAL PAIN
TYPE: ACUTE PAIN
TYPE: ACUTE PAIN
TYPE: SURGICAL PAIN;ACUTE PAIN
TYPE: ACUTE PAIN;SURGICAL PAIN
TYPE: ACUTE PAIN;SURGICAL PAIN

## 2025-05-31 NOTE — PROGRESS NOTES
Report received from RN. Pt assessment complete. Reviewed POC for this evening including scheduled pain medication and VS checks. Pt encouraged to call with needs at any time. Call light is within reach of pt.

## 2025-05-31 NOTE — CARE PLAN
Problem: Psychosocial - Postpartum  Goal: Patient will verbalize and demonstrate effective bonding and parenting behavior  Outcome: Progressing     Problem: Altered Physiologic Condition  Goal: Patient physiologically stable as evidenced by normal lochia, palpable uterine involution and vitals within normal limits  Outcome: Progressing     Problem: Early Mobilization - Post Surgery  Goal: Early mobilization post surgery  Outcome: Progressing   The patient is Stable - Low risk of patient condition declining or worsening    Shift Goals  Clinical Goals: lochia WDL; ambulation  Patient Goals: pain WDL  Family Goals: bond    Progress made toward(s) clinical / shift goals:  pt lochia is WDL. Pain has been managed with rest and pain medication. Pt has been ambulating in the room. Educated pt to ambulate in the hallway 3-4 times a day. Pt has been caring for self and baby throughout the shift. Pt breastfeeds baby independently.     Patient is not progressing towards the following goals:

## 2025-05-31 NOTE — PROGRESS NOTES
Obstetrics & Gynecology Post-Delivery Progress Note    Date of Service  2025    39 y.o.  s/p  for failure to progress  Delivery date: 2025    Hospital Course  Developed HELLP immediately postpartum. Blood pressures were severe range. Labetalol and Nifedipine initiated. Mag x24 hours. Blood pressure are stable on labetalol 200BID. She reports feeling well though is still in significant pain.    Subjective  Pain: Yes,  not controlled  Bleeding: lochia moderate  PO's: taking regular diet  Voiding: without difficulty  Ambulating: yes  Passing flatus: Yes  Feeding: breastfeeding well    Objective  Temp:  [36.3 °C (97.4 °F)-36.8 °C (98.2 °F)] 36.8 °C (98.2 °F)  Pulse:  [80-97] 80  Resp:  [15-19] 18  BP: (118-140)/(73-85) 127/78  SpO2:  [94 %-97 %] 94 %    Physical Exam  General: well  Chest/Breasts: nipples intact   Abdomen: non-distended  Fundus: at umbilicus  Incision: dressing clean, dry, intact  Perineum: well approximated  Extremities: 1+ edema, calves nontender    Recent Labs     25  0927 25  1719 25  0500   WBC 21.6* 26.7* 18.5*   RBC 4.21 3.80* 3.20*   HEMOGLOBIN 13.9 12.3 10.5*   HEMATOCRIT 39.9 36.2* 30.5*   MCV 94.8 95.3 95.3   MCH 33.0 32.4 32.8   RDW 45.8 45.3 44.9   PLATELETCT 101* 120* 115*   MPV 12.0 11.9 12.3   NEUTSPOLYS 89.40*  --   --    LYMPHOCYTES 5.40*  --   --    MONOCYTES 3.90  --   --    EOSINOPHILS 0.00  --   --    BASOPHILS 0.30  --   --        Assessment/Plan  Lina Lazo is a 39 y.o.yo  s/p postop day #2  s/p  for failure to progress    1. Post care: meeting all goals  2. Hemodynamics: stable  3. Pain: working on better control  4. Rh+, Rubella Immune  5. Method of Feeding: plans to breastfeed  6. Method of Contraception: pt desires BTL though not performed, will plan IUD at 6 week PP visit  7. HELLP: CMP, CBC stable  8. Disposition: likely home postop day 3-4    VTE prophylaxis: lovenox 40mg subQ daily

## 2025-05-31 NOTE — LACTATION NOTE
Follow up lactation consultation:    Met with Lina and baby Aryan to provide lactation support. Aryan has a 48 hour weight loss of >10%; he continues to breast feed well, with cluster feeding overnight. Lina does not yet notice her breasts to be filling. Recommend implementation of three-step feeding plan until milk volume increases. Lina is in agreement of this.    Pumping initiated. Settings and pump use reviewed and demonstrated. 25 mm flanges fit appropriately and patient reports comfort @ 20% suction. Pump at speed of 80cpm for 2 min and then turn down to 60cpm for a total of 15min every 2-3hrs. Follow with 2-3 minutes hand expression. Anticipatory guidance provided regarding typical volumes of colostrum expressed in the first 1-3 days.     Handouts provided ([place]ian): How to Maximize Milk Production, Pump Parts Washing Guide, CDC Hand Expression and Milk Storage Guidelines.     Paced bottle feeding technique demonstrated.     Feeding Plan:     Three-step feeding plan, as follows:    Cue-based breastfeeding attempts, at least once every three hours, for a total of 8+ feedings per 24 hours.  Provide supplementation every three hours, via paced bottle feeding, as per supplemental feeding guidelines. Offer expressed breast milk first, if available, then follow with volumes of formula/DBM to meet total volume required.  Pump breasts with double electric breast pump following each supplemental bottle feeding.    Lina is provided with the opportunity to ask questions. These have been answered to her satisfaction. She is encouraged to call RN/lactation for additional breastfeeding assistance, as needed, throughout remainder of hospital stay.

## 2025-06-01 ENCOUNTER — PHARMACY VISIT (OUTPATIENT)
Dept: PHARMACY | Facility: MEDICAL CENTER | Age: 40
End: 2025-06-01
Payer: COMMERCIAL

## 2025-06-01 VITALS
DIASTOLIC BLOOD PRESSURE: 94 MMHG | WEIGHT: 193 LBS | HEART RATE: 95 BPM | OXYGEN SATURATION: 95 % | SYSTOLIC BLOOD PRESSURE: 127 MMHG | TEMPERATURE: 98.6 F | BODY MASS INDEX: 34.2 KG/M2 | HEIGHT: 63 IN | RESPIRATION RATE: 18 BRPM

## 2025-06-01 LAB
ALBUMIN SERPL BCP-MCNC: 2.4 G/DL (ref 3.2–4.9)
ALBUMIN/GLOB SERPL: 0.9 G/DL
ALP SERPL-CCNC: 144 U/L (ref 30–99)
ALT SERPL-CCNC: 44 U/L (ref 2–50)
ANION GAP SERPL CALC-SCNC: 9 MMOL/L (ref 7–16)
AST SERPL-CCNC: 38 U/L (ref 12–45)
BILIRUB SERPL-MCNC: <0.2 MG/DL (ref 0.1–1.5)
BUN SERPL-MCNC: 21 MG/DL (ref 8–22)
CALCIUM ALBUM COR SERPL-MCNC: 9.4 MG/DL (ref 8.5–10.5)
CALCIUM SERPL-MCNC: 8.1 MG/DL (ref 8.5–10.5)
CHLORIDE SERPL-SCNC: 107 MMOL/L (ref 96–112)
CO2 SERPL-SCNC: 19 MMOL/L (ref 20–33)
CREAT SERPL-MCNC: 1.01 MG/DL (ref 0.5–1.4)
GFR SERPLBLD CREATININE-BSD FMLA CKD-EPI: 73 ML/MIN/1.73 M 2
GLOBULIN SER CALC-MCNC: 2.7 G/DL (ref 1.9–3.5)
GLUCOSE SERPL-MCNC: 72 MG/DL (ref 65–99)
POTASSIUM SERPL-SCNC: 4 MMOL/L (ref 3.6–5.5)
PROT SERPL-MCNC: 5.1 G/DL (ref 6–8.2)
SODIUM SERPL-SCNC: 135 MMOL/L (ref 135–145)

## 2025-06-01 PROCEDURE — A9270 NON-COVERED ITEM OR SERVICE: HCPCS | Performed by: OBSTETRICS & GYNECOLOGY

## 2025-06-01 PROCEDURE — 36415 COLL VENOUS BLD VENIPUNCTURE: CPT

## 2025-06-01 PROCEDURE — RXMED WILLOW AMBULATORY MEDICATION CHARGE

## 2025-06-01 PROCEDURE — 80053 COMPREHEN METABOLIC PANEL: CPT

## 2025-06-01 PROCEDURE — 700102 HCHG RX REV CODE 250 W/ 637 OVERRIDE(OP): Performed by: OBSTETRICS & GYNECOLOGY

## 2025-06-01 RX ORDER — ACETAMINOPHEN 500 MG
1000 TABLET ORAL EVERY 6 HOURS PRN
Qty: 30 TABLET | Refills: 0 | Status: SHIPPED | OUTPATIENT
Start: 2025-06-01

## 2025-06-01 RX ORDER — LABETALOL 200 MG/1
200 TABLET, FILM COATED ORAL 2 TIMES DAILY
Qty: 60 TABLET | Refills: 2 | Status: SHIPPED | OUTPATIENT
Start: 2025-06-01

## 2025-06-01 RX ORDER — ENOXAPARIN SODIUM 100 MG/ML
40 INJECTION SUBCUTANEOUS DAILY
Qty: 45 EACH | Refills: 0 | Status: ACTIVE | OUTPATIENT
Start: 2025-06-01 | End: 2025-07-16

## 2025-06-01 RX ORDER — IBUPROFEN 800 MG/1
800 TABLET, FILM COATED ORAL EVERY 8 HOURS PRN
Qty: 30 TABLET | Refills: 0 | Status: SHIPPED | OUTPATIENT
Start: 2025-06-01

## 2025-06-01 RX ORDER — OXYCODONE HYDROCHLORIDE 5 MG/1
5 TABLET ORAL EVERY 8 HOURS PRN
Qty: 6 TABLET | Refills: 0 | Status: SHIPPED | OUTPATIENT
Start: 2025-06-01 | End: 2025-06-03

## 2025-06-01 RX ORDER — VITAMIN A ACETATE, BETA CAROTENE, ASCORBIC ACID, CHOLECALCIFEROL, .ALPHA.-TOCOPHEROL ACETATE, DL-, THIAMINE MONONITRATE, RIBOFLAVIN, NIACINAMIDE, PYRIDOXINE HYDROCHLORIDE, FOLIC ACID, CYANOCOBALAMIN, CALCIUM CARBONATE, FERROUS FUMARATE, ZINC OXIDE, CUPRIC OXIDE 3080; 12; 120; 400; 1; 1.84; 3; 20; 22; 920; 25; 200; 27; 10; 2 [IU]/1; UG/1; MG/1; [IU]/1; MG/1; MG/1; MG/1; MG/1; MG/1; [IU]/1; MG/1; MG/1; MG/1; MG/1; MG/1
1 TABLET, FILM COATED ORAL DAILY
Qty: 30 TABLET | Refills: 0 | Status: SHIPPED | OUTPATIENT
Start: 2025-06-01

## 2025-06-01 RX ORDER — PSEUDOEPHEDRINE HCL 30 MG
100 TABLET ORAL 2 TIMES DAILY PRN
Qty: 60 CAPSULE | Refills: 0 | Status: SHIPPED | OUTPATIENT
Start: 2025-06-01

## 2025-06-01 RX ADMIN — ACETAMINOPHEN 1000 MG: 500 TABLET ORAL at 05:43

## 2025-06-01 RX ADMIN — IBUPROFEN 800 MG: 800 TABLET, FILM COATED ORAL at 05:43

## 2025-06-01 RX ADMIN — ACETAMINOPHEN 1000 MG: 500 TABLET ORAL at 00:43

## 2025-06-01 RX ADMIN — DOCUSATE SODIUM 100 MG: 100 CAPSULE, LIQUID FILLED ORAL at 07:20

## 2025-06-01 RX ADMIN — PRENATAL WITH FERROUS FUM AND FOLIC ACID 1 TABLET: 3080; 920; 120; 400; 22; 1.84; 3; 20; 10; 1; 12; 200; 27; 25; 2 TABLET ORAL at 07:20

## 2025-06-01 RX ADMIN — LABETALOL HYDROCHLORIDE 200 MG: 100 TABLET, FILM COATED ORAL at 05:42

## 2025-06-01 ASSESSMENT — PAIN DESCRIPTION - PAIN TYPE
TYPE: ACUTE PAIN

## 2025-06-01 NOTE — PROGRESS NOTES
Assumed patient care. Pt is resting comfortably with baby in crib. Patient assessment completed. Discussed infant bulb suction and emergency call light system. POC reviewed with patient, all questions answered. Will continue to monitor, call light in reach.     1132: notified MD Nazario of pt's repeat BP. Pt okay to DC.

## 2025-06-01 NOTE — LACTATION NOTE
Follow up lactation consultation:    Met with Lina to provide follow up lactation support prior to hospital discharge. Lina reports that she has continued with her three-step feeding plan overnight. Baby has regained a significant amount of weight over the past 24 hours, and is now at a loss of 6%. Lina's breasts remain soft, and she is feeling discouraged that she does not have more milk yet. Lina is reassured that it is normal to take up to 5 days for milk volumes to increase.     Lina reports that baby is due to feed at 1030. She requests  to return at this time for latch assist.    1030-LC Follow up. Patient in shower.     1230-LC Follow up. Patient reports that her left breast is now beginning to fill. Hand expression performed for easily expressible colostrum. Infant recently fed, and Lina is not interested in attempting to latch at this time.  We reviewed comfort measures for filling breasts, and reviewed engorgement/mastitis precautions.    Feeding Plan:    Recommend continued three-step feeding plan until evidence of adequate milk transfer at breast.     Lina is provided with the opportunity to ask questions. These are answered to her satisfaction.     Close follow up with both [facility] and pediatrician advised-patient is amenable to this plan.

## 2025-06-01 NOTE — DISCHARGE INSTRUCTIONS

## 2025-06-01 NOTE — CARE PLAN
The patient is Stable - Low risk of patient condition declining or worsening    Shift Goals  Clinical Goals: VSS, lochia light, fundus firm  Patient Goals: rest  Family Goals: support    Progress made toward(s) clinical / shift goals:    Problem: Pain - Standard  Goal: Alleviation of pain or a reduction in pain to the patient’s comfort goal  Outcome: Progressing     Problem: Knowledge Deficit - Postpartum  Goal: Patient will verbalize and demonstrate understanding of self and infant care  Outcome: Progressing     Problem: Psychosocial - Postpartum  Goal: Patient will verbalize and demonstrate effective bonding and parenting behavior  Outcome: Progressing     Problem: Early Mobilization - Post Surgery  Goal: Early mobilization post surgery  Outcome: Progressing       Patient is not progressing towards the following goals:

## 2025-06-01 NOTE — DISCHARGE SUMMARY
[facility]  Obstetrics Discharge Summary    Date of Admission: 2025  Date of Discharge: 25    Admitting diagnosis:    1. Pregnancy at 40w0d  2.  Gestational hypertension    Discharge Diagnosis:   1. Status post  for maternal request.  2.  Preeclampsia without severe features  3.  HELLP syndrome  4.  Acute blood loss anemia    Hospital Course:   Pt is 39 y.o. now  who presented on 2025 for IOL for gestational hypertension.   Labor induction performed with Cervidil and Cook's balloon.  Patient progressed to 7 cm but then requested  section due to discomfort despite epidural.  Patient progressed to a  delivery.     Immediately postop, patient went into HELLP syndrome and was started on mag.  Complete 24 hours of mag and started on labetalol.  Postpartum course was unremarkable and patient has met all postpartum milestones.  Patient had early ambulation, well managed pain, tolerance of diet, spontaneous voiding, and appropriate feeding of infant.   She has remained afebrile and blood pressure has been well controlled.   All maternal questions and concerns addressed.    Single male infant was delivered via PLTCS on  at 07 34 with APGARs 7 and 9 at 1 and 5 minutes respectively.    ml    PHYSICAL EXAM:  Temp:  [36.9 °C (98.5 °F)-37.6 °C (99.6 °F)] 37 °C (98.6 °F)  Pulse:  [] 95  Resp:  [17-18] 18  BP: (113-145)/(73-92) 145/86  SpO2:  [95 %-97 %] 95 %    GEN: well appearing, no apparent distress  CV: +S1S2, RRR, no BLE edema  RESP: CTAB, breathing comfortably on RA  ABD: soft, non-tender, non-distended, +BS  Fundus: firm below level of umbilicus  Incision: dressing clean, dry, intact and healing well, staples removed and Steri-Strips applied  Perineum: Deferred  Extremities: symmetric, calves nontender    HISTORY:  Problem List[1]   Past Medical History[2]  OB History    Para Term  AB Living   1 1 1   1   SAB IAB Ectopic Molar Multiple  Live Births       0 1      # Outcome Date GA Lbr Balbir/2nd Weight Sex Type Anes PTL Lv   1 Term 05/29/25 40w0d  3.3 kg (7 lb 4.4 oz) M CS-LTranv EPI, Spinal N EMELY      Complications: Dysfunctional Labor     Past Surgical History[3]  Allergies[4]   Current Facility-Administered Medications   Medication Dose    oxyCODONE immediate-release (Roxicodone) tablet 5 mg  5 mg    oxyCODONE immediate release (Roxicodone) tablet 10 mg  10 mg    lactated ringers infusion  2,000 mL    ibuprofen (Motrin) tablet 800 mg  800 mg    Followed by    [START ON 6/2/2025] ibuprofen (Motrin) tablet 800 mg  800 mg    acetaminophen (Tylenol) tablet 1,000 mg  1,000 mg    Followed by    [START ON 6/2/2025] acetaminophen (Tylenol) tablet 1,000 mg  1,000 mg    diphenhydrAMINE (Benadryl) tablet/capsule 25 mg  25 mg    Or    diphenhydrAMINE (Benadryl) injection 25 mg  25 mg    docusate sodium (Colace) capsule 100 mg  100 mg    bisacodyl (Dulcolax) suppository 10 mg  10 mg    magnesium hydroxide (Milk Of Magnesia) suspension 30 mL  30 mL    prenatal plus vitamin (Stuartnatal 1+1) 27-1 MG tablet 1 Tablet  1 Tablet    simethicone (Mylicon) chewable tablet 125 mg  125 mg    calcium carbonate (Tums) chewable tab 1,000 mg  1,000 mg    labetalol (Normodyne) tablet 200 mg  200 mg     Recent Labs     05/29/25  1719 05/30/25  0500   WBC 26.7* 18.5*   RBC 3.80* 3.20*   HEMOGLOBIN 12.3 10.5*   HEMATOCRIT 36.2* 30.5*   MCV 95.3 95.3   MCH 32.4 32.8   MCHC 34.0 34.4   RDW 45.3 44.9   PLATELETCT 120* 115*   MPV 11.9 12.3       Discharge Meds:   Current Outpatient Medications   Medication Sig Dispense Refill    docusate sodium 100 MG Cap Take 100 mg by mouth 2 times a day as needed for Constipation. 60 Capsule 0    labetalol (NORMODYNE) 200 MG Tab Take 1 Tablet by mouth 2 times a day. 60 Tablet 2    oxyCODONE immediate-release (ROXICODONE) 5 MG Tab Take 1 Tablet by mouth every 8 hours as needed for Severe Pain for up to 2 days. 6 Tablet 0    prenatal plus vitamin  (STUARTNATAL 1+1) 27-1 MG Tab tablet Take 1 Tablet by mouth every day. 30 Tablet 0    ibuprofen (MOTRIN) 800 MG Tab Take 1 Tablet by mouth every 8 hours as needed for Mild Pain or Moderate Pain. 30 Tablet 0    acetaminophen (TYLENOL) 500 MG Tab Take 2 Tablets by mouth every 6 hours as needed for Moderate Pain or Mild Pain. 30 Tablet 0    enoxaparin (LOVENOX) 40 MG/0.4ML Solution Prefilled Syringe inj Inject 40 mg under the skin every day for 45 days. 45 Each 0     #HELLP syndrome  -Completed 24 hours mag  -Continue labetalol 200 mg twice daily    -BP check in clinic in 1 week  #Contraception  - Patient desires bilateral tubal ligation, consent signed and scanned into chart    Activity/ Discharge Instructions:  Discharge to home  Exercise and Activities as tolerated  Call or come to ED for: heavy vaginal bleeding, fever >100.4, severe abdominal pain, severe headache, chest pain, shortness of breath, significant nausea or vomiting, incisional drainage, or other concerns.    Diet:  As tolerated. Additional 400 kcal per day to maintain milk supply. Drink plenty of fluids daily.  Continue prenatal vitamins for six months or as long as breastfeeding.  Continue iron and vitamin C every other day for six months or until anemia improves.     Follow up:     [facility] in one week for incision check for  delivery.    Moises Nazario MD  PGY1  [facility]          [1]   Patient Active Problem List  Diagnosis    Advanced maternal age, primigravida, antepartum    Abnormal pregnancy US    Swelling of both hands    Prenatal care in third trimester    Labor and delivery indication for care or intervention    Encounter for induction of labor   [2]   Past Medical History:  Diagnosis Date    Allergy    [3]   Past Surgical History:  Procedure Laterality Date    PRIMARY C SECTION Bilateral 2025    Procedure:  SECTION, PRIMARY;  Surgeon: Keena Granado M.D.;  Location: SURGERY LABOR AND DELIVERY;   Service: Obstetrics    LIPOSUCTION      2016   [4] No Known Allergies

## 2025-06-01 NOTE — PROGRESS NOTES
Discharge paperwork and handouts provided to pt. All questions answered and all belongings returned. Car seat check completed, pt safely and correctly placed infant in car seat. ID bands verified with infants, pt and infant escorted to car.

## 2025-06-01 NOTE — PROGRESS NOTES
Assessment completed, fundus firm, lochia light. Plan of care reviewed, verbalized understanding. Patient denies pain at this time, will call if pain med intervention needed.

## 2025-07-07 ENCOUNTER — POST PARTUM (OUTPATIENT)
Dept: OBGYN | Facility: CLINIC | Age: 40
End: 2025-07-07

## 2025-07-07 VITALS — DIASTOLIC BLOOD PRESSURE: 80 MMHG | WEIGHT: 166.4 LBS | SYSTOLIC BLOOD PRESSURE: 110 MMHG | BODY MASS INDEX: 29.48 KG/M2

## 2025-07-07 PROBLEM — M79.89 SWELLING OF BOTH HANDS: Status: RESOLVED | Noted: 2025-02-05 | Resolved: 2025-07-07

## 2025-07-07 PROBLEM — O28.3 ABNORMAL PREGNANCY US: Status: RESOLVED | Noted: 2025-01-24 | Resolved: 2025-07-07

## 2025-07-07 PROBLEM — O09.519 ADVANCED MATERNAL AGE, PRIMIGRAVIDA, ANTEPARTUM: Status: RESOLVED | Noted: 2024-11-01 | Resolved: 2025-07-07

## 2025-07-07 PROBLEM — Z34.93 PRENATAL CARE IN THIRD TRIMESTER: Status: RESOLVED | Noted: 2025-04-04 | Resolved: 2025-07-07

## 2025-07-07 PROBLEM — Z34.90 ENCOUNTER FOR INDUCTION OF LABOR: Status: RESOLVED | Noted: 2025-05-27 | Resolved: 2025-07-07

## 2025-07-07 PROCEDURE — 0503F POSTPARTUM CARE VISIT: CPT

## 2025-07-07 PROCEDURE — 3079F DIAST BP 80-89 MM HG: CPT

## 2025-07-07 PROCEDURE — 3074F SYST BP LT 130 MM HG: CPT

## 2025-07-07 ASSESSMENT — EDINBURGH POSTNATAL DEPRESSION SCALE (EPDS)
THE THOUGHT OF HARMING MYSELF HAS OCCURRED TO ME: NEVER
I HAVE BLAMED MYSELF UNNECESSARILY WHEN THINGS WENT WRONG: YES, SOME OF THE TIME
TOTAL SCORE: 7
I HAVE FELT SCARED OR PANICKY FOR NO GOOD REASON: NO, NOT MUCH
I HAVE BEEN SO UNHAPPY THAT I HAVE BEEN CRYING: ONLY OCCASIONALLY
I HAVE FELT SAD OR MISERABLE: NO, NOT AT ALL
I HAVE BEEN SO UNHAPPY THAT I HAVE HAD DIFFICULTY SLEEPING: NOT AT ALL
I HAVE BEEN ABLE TO LAUGH AND SEE THE FUNNY SIDE OF THINGS: AS MUCH AS I ALWAYS COULD
I HAVE BEEN ANXIOUS OR WORRIED FOR NO GOOD REASON: YES, SOMETIMES
THINGS HAVE BEEN GETTING ON TOP OF ME: NO, MOST OF THE TIME I HAVE COPED QUITE WELL
I HAVE LOOKED FORWARD WITH ENJOYMENT TO THINGS: AS MUCH AS I EVER DID

## 2025-07-07 ASSESSMENT — FIBROSIS 4 INDEX: FIB4 SCORE: 1.84

## 2025-07-07 NOTE — PROGRESS NOTES
Pt here today for postpartum exam.  Delivery type: c/s 5/29   Currently :breast and bottle   Desired BCM: none ,   LMP: n/a   Last pap: 11/1/24 wnl   Phone # [phone] (home)   Epds 7   Pt is experiencing back pain

## 2025-07-07 NOTE — PROGRESS NOTES
Subjective   Subjective:    Lina Lazo is a 39 y.o. female who presents for her postpartum exam 5 weeks following a primary  section for arrest of dilation . Her prenatal course was uncomplicated.  Her delivery was complicated by dysfunctional labor and pre eclampsia. Her method of feeding infant is breast and bottle feeding. She desired sterilization at the time of delivery.  She reports signing the tubal consent on . She states they were unable to do the bilateral salpingectomy.  Reports no sex prior to this appointment. Patient denies crying spells, irritability, or mood swings. She reports some pain on the left side of her incision.     Pap WNL on 2024    Eating a regular diet without difficulty.   Bowel movement are Normal.      Denies breast problems, dysuria, vaginal bleeding, vaginal itching    EDPS 7    Problem List     Patient Active Problem List    Diagnosis Date Noted    Postpartum care following vaginal delivery 2025       Objective    Lab:  Recent Results (from the past 6 weeks)   PROTEIN/CREAT RATIO URINE    Collection Time: 25  1:30 PM   Result Value Ref Range    Total Protein, Urine 76.6 (H) 0.0 - 15.0 mg/dL    Creatinine, Random Urine 47.00 mg/dL    Protein Creatinine Ratio 1630 (H) 10 - 107 mg/g   Comp Metabolic Panel    Collection Time: 25  1:45 PM   Result Value Ref Range    Sodium 135 135 - 145 mmol/L    Potassium 4.1 3.6 - 5.5 mmol/L    Chloride 108 96 - 112 mmol/L    Co2 16 (L) 20 - 33 mmol/L    Anion Gap 11.0 7.0 - 16.0    Glucose 82 65 - 99 mg/dL    Bun 18 8 - 22 mg/dL    Creatinine 0.80 0.50 - 1.40 mg/dL    Calcium 9.3 8.5 - 10.5 mg/dL    Correct Calcium 10.1 8.5 - 10.5 mg/dL    AST(SGOT) 35 12 - 45 U/L    ALT(SGPT) 38 2 - 50 U/L    Alkaline Phosphatase 200 (H) 30 - 99 U/L    Total Bilirubin 0.2 0.1 - 1.5 mg/dL    Albumin 3.0 (L) 3.2 - 4.9 g/dL    Total Protein 6.0 6.0 - 8.2 g/dL    Globulin 3.0 1.9 - 3.5 g/dL    A-G Ratio 1.0 g/dL   CBC  with differential    Collection Time: 05/27/25  1:45 PM   Result Value Ref Range    WBC 9.6 4.8 - 10.8 K/uL    RBC 3.80 (L) 4.20 - 5.40 M/uL    Hemoglobin 12.4 12.0 - 16.0 g/dL    Hematocrit 36.3 (L) 37.0 - 47.0 %    MCV 95.5 81.4 - 97.8 fL    MCH 32.6 27.0 - 33.0 pg    MCHC 34.2 32.2 - 35.5 g/dL    RDW 45.8 35.9 - 50.0 fL    Platelet Count 117 (L) 164 - 446 K/uL    MPV 12.8 9.0 - 12.9 fL    Neutrophils-Polys 67.40 44.00 - 72.00 %    Lymphocytes 22.00 22.00 - 41.00 %    Monocytes 7.10 0.00 - 13.40 %    Eosinophils 0.90 0.00 - 6.90 %    Basophils 0.60 0.00 - 1.80 %    Immature Granulocytes 2.00 (H) 0.00 - 0.90 %    Nucleated RBC 0.00 0.00 - 0.20 /100 WBC    Neutrophils (Absolute) 6.47 1.82 - 7.42 K/uL    Lymphs (Absolute) 2.11 1.00 - 4.80 K/uL    Monos (Absolute) 0.68 0.00 - 0.85 K/uL    Eos (Absolute) 0.09 0.00 - 0.51 K/uL    Baso (Absolute) 0.06 0.00 - 0.12 K/uL    Immature Granulocytes (abs) 0.19 (H) 0.00 - 0.11 K/uL    NRBC (Absolute) 0.00 K/uL   T.PALLIDUM AB [place] (Syphilis)    Collection Time: 05/27/25  1:45 PM   Result Value Ref Range    Syphilis, Treponemal Qual Non-Reactive Non-Reactive   HOLD BLOOD BANK SPECIMEN (NOT TESTED)    Collection Time: 05/27/25  1:45 PM   Result Value Ref Range    Holding Tube - Bb DONE    ESTIMATED GFR    Collection Time: 05/27/25  1:45 PM   Result Value Ref Range    GFR (CKD-EPI) 97 >60 mL/min/1.73 m 2   CBC WITH DIFFERENTIAL    Collection Time: 05/28/25  1:12 AM   Result Value Ref Range    WBC 10.1 4.8 - 10.8 K/uL    RBC 3.94 (L) 4.20 - 5.40 M/uL    Hemoglobin 12.6 12.0 - 16.0 g/dL    Hematocrit 37.2 37.0 - 47.0 %    MCV 94.4 81.4 - 97.8 fL    MCH 32.0 27.0 - 33.0 pg    MCHC 33.9 32.2 - 35.5 g/dL    RDW 45.1 35.9 - 50.0 fL    Platelet Count 121 (L) 164 - 446 K/uL    MPV 12.6 9.0 - 12.9 fL    Neutrophils-Polys 64.40 44.00 - 72.00 %    Lymphocytes 25.60 22.00 - 41.00 %    Monocytes 6.20 0.00 - 13.40 %    Eosinophils 1.50 0.00 - 6.90 %    Basophils 0.40 0.00 - 1.80 %    Immature  Granulocytes 1.90 (H) 0.00 - 0.90 %    Nucleated RBC 0.20 0.00 - 0.20 /100 WBC    Neutrophils (Absolute) 6.50 1.82 - 7.42 K/uL    Lymphs (Absolute) 2.58 1.00 - 4.80 K/uL    Monos (Absolute) 0.62 0.00 - 0.85 K/uL    Eos (Absolute) 0.15 0.00 - 0.51 K/uL    Baso (Absolute) 0.04 0.00 - 0.12 K/uL    Immature Granulocytes (abs) 0.19 (H) 0.00 - 0.11 K/uL    NRBC (Absolute) 0.02 K/uL   CBC with Differential    Collection Time: 05/29/25  9:27 AM   Result Value Ref Range    WBC 21.6 (H) 4.8 - 10.8 K/uL    RBC 4.21 4.20 - 5.40 M/uL    Hemoglobin 13.9 12.0 - 16.0 g/dL    Hematocrit 39.9 37.0 - 47.0 %    MCV 94.8 81.4 - 97.8 fL    MCH 33.0 27.0 - 33.0 pg    MCHC 34.8 32.2 - 35.5 g/dL    RDW 45.8 35.9 - 50.0 fL    Platelet Count 101 (L) 164 - 446 K/uL    MPV 12.0 9.0 - 12.9 fL    Neutrophils-Polys 89.40 (H) 44.00 - 72.00 %    Lymphocytes 5.40 (L) 22.00 - 41.00 %    Monocytes 3.90 0.00 - 13.40 %    Eosinophils 0.00 0.00 - 6.90 %    Basophils 0.30 0.00 - 1.80 %    Immature Granulocytes 1.00 (H) 0.00 - 0.90 %    Nucleated RBC 0.00 0.00 - 0.20 /100 WBC    Neutrophils (Absolute) 19.33 (H) 1.82 - 7.42 K/uL    Lymphs (Absolute) 1.16 1.00 - 4.80 K/uL    Monos (Absolute) 0.84 0.00 - 0.85 K/uL    Eos (Absolute) 0.01 0.00 - 0.51 K/uL    Baso (Absolute) 0.06 0.00 - 0.12 K/uL    Immature Granulocytes (abs) 0.22 (H) 0.00 - 0.11 K/uL    NRBC (Absolute) 0.00 K/uL   T.PALLIDUM AB [place] (Syphilis)    Collection Time: 05/29/25  9:27 AM   Result Value Ref Range    Syphilis, Treponemal Qual Non-Reactive Non-Reactive   Comp Metabolic Panel    Collection Time: 05/29/25  9:27 AM   Result Value Ref Range    Sodium 132 (L) 135 - 145 mmol/L    Potassium 4.2 3.6 - 5.5 mmol/L    Chloride 105 96 - 112 mmol/L    Co2 15 (L) 20 - 33 mmol/L    Anion Gap 12.0 7.0 - 16.0    Glucose 89 65 - 99 mg/dL    Bun 21 8 - 22 mg/dL    Creatinine 1.70 (H) 0.50 - 1.40 mg/dL    Calcium 8.0 (L) 8.5 - 10.5 mg/dL    Correct Calcium 9.1 8.5 - 10.5 mg/dL    AST(SGOT) 69 (H) 12 - 45 U/L     ALT(SGPT) 75 (H) 2 - 50 U/L    Alkaline Phosphatase 211 (H) 30 - 99 U/L    Total Bilirubin 0.6 0.1 - 1.5 mg/dL    Albumin 2.6 (L) 3.2 - 4.9 g/dL    Total Protein 5.6 (L) 6.0 - 8.2 g/dL    Globulin 3.0 1.9 - 3.5 g/dL    A-G Ratio 0.9 g/dL   ESTIMATED GFR    Collection Time: 05/29/25  9:27 AM   Result Value Ref Range    GFR (CKD-EPI) 39 (A) >60 mL/min/1.73 m 2   Serum magnesium levels every 6 hours until desired range( 5-8mg/dl) is achieved while infusion is running    Collection Time: 05/29/25 10:27 AM   Result Value Ref Range    Magnesium 3.8 (H) 1.5 - 2.5 mg/dL   Serum magnesium levels every 6 hours until desired range( 5-8mg/dl) is achieved while infusion is running    Collection Time: 05/29/25 11:31 AM   Result Value Ref Range    Magnesium 4.1 (H) 1.5 - 2.5 mg/dL   CBC without differential- Once in 8 hours post delivery    Collection Time: 05/29/25  5:19 PM   Result Value Ref Range    WBC 26.7 (H) 4.8 - 10.8 K/uL    RBC 3.80 (L) 4.20 - 5.40 M/uL    Hemoglobin 12.3 12.0 - 16.0 g/dL    Hematocrit 36.2 (L) 37.0 - 47.0 %    MCV 95.3 81.4 - 97.8 fL    MCH 32.4 27.0 - 33.0 pg    MCHC 34.0 32.2 - 35.5 g/dL    RDW 45.3 35.9 - 50.0 fL    Platelet Count 120 (L) 164 - 446 K/uL    MPV 11.9 9.0 - 12.9 fL   Serum magnesium levels every 6 hours until desired range( 5-8mg/dl) is achieved while infusion is running    Collection Time: 05/29/25  5:19 PM   Result Value Ref Range    Magnesium 6.3 (H) 1.5 - 2.5 mg/dL   MAGNESIUM    Collection Time: 05/29/25 10:59 PM   Result Value Ref Range    Magnesium 7.3 (H) 1.5 - 2.5 mg/dL   CBC WITHOUT DIFFERENTIAL    Collection Time: 05/30/25  5:00 AM   Result Value Ref Range    WBC 18.5 (H) 4.8 - 10.8 K/uL    RBC 3.20 (L) 4.20 - 5.40 M/uL    Hemoglobin 10.5 (L) 12.0 - 16.0 g/dL    Hematocrit 30.5 (L) 37.0 - 47.0 %    MCV 95.3 81.4 - 97.8 fL    MCH 32.8 27.0 - 33.0 pg    MCHC 34.4 32.2 - 35.5 g/dL    RDW 44.9 35.9 - 50.0 fL    Platelet Count 115 (L) 164 - 446 K/uL    MPV 12.3 9.0 - 12.9 fL    Comp Metabolic Panel    Collection Time: 05/30/25  5:00 AM   Result Value Ref Range    Sodium 135 135 - 145 mmol/L    Potassium 4.3 3.6 - 5.5 mmol/L    Chloride 106 96 - 112 mmol/L    Co2 19 (L) 20 - 33 mmol/L    Anion Gap 10.0 7.0 - 16.0    Glucose 110 (H) 65 - 99 mg/dL    Bun 16 8 - 22 mg/dL    Creatinine 1.03 0.50 - 1.40 mg/dL    Calcium 7.8 (L) 8.5 - 10.5 mg/dL    Correct Calcium 9.1 8.5 - 10.5 mg/dL    AST(SGOT) 35 12 - 45 U/L    ALT(SGPT) 47 2 - 50 U/L    Alkaline Phosphatase 159 (H) 30 - 99 U/L    Total Bilirubin 0.3 0.1 - 1.5 mg/dL    Albumin 2.4 (L) 3.2 - 4.9 g/dL    Total Protein 5.0 (L) 6.0 - 8.2 g/dL    Globulin 2.6 1.9 - 3.5 g/dL    A-G Ratio 0.9 g/dL   MAGNESIUM    Collection Time: 05/30/25  5:00 AM   Result Value Ref Range    Magnesium 7.9 (H) 1.5 - 2.5 mg/dL   ESTIMATED GFR    Collection Time: 05/30/25  5:00 AM   Result Value Ref Range    GFR (CKD-EPI) 72 >60 mL/min/1.73 m 2   Comp Metabolic Panel    Collection Time: 06/01/25  6:32 AM   Result Value Ref Range    Sodium 135 135 - 145 mmol/L    Potassium 4.0 3.6 - 5.5 mmol/L    Chloride 107 96 - 112 mmol/L    Co2 19 (L) 20 - 33 mmol/L    Anion Gap 9.0 7.0 - 16.0    Glucose 72 65 - 99 mg/dL    Bun 21 8 - 22 mg/dL    Creatinine 1.01 0.50 - 1.40 mg/dL    Calcium 8.1 (L) 8.5 - 10.5 mg/dL    Correct Calcium 9.4 8.5 - 10.5 mg/dL    AST(SGOT) 38 12 - 45 U/L    ALT(SGPT) 44 2 - 50 U/L    Alkaline Phosphatase 144 (H) 30 - 99 U/L    Total Bilirubin <0.2 0.1 - 1.5 mg/dL    Albumin 2.4 (L) 3.2 - 4.9 g/dL    Total Protein 5.1 (L) 6.0 - 8.2 g/dL    Globulin 2.7 1.9 - 3.5 g/dL    A-G Ratio 0.9 g/dL   ESTIMATED GFR    Collection Time: 06/01/25  6:32 AM   Result Value Ref Range    GFR (CKD-EPI) 73 >60 mL/min/1.73 m 2     Vitals:    07/07/25 0758   BP: 110/80   Weight: 166 lb 6.4 oz     Vitals:    07/07/25 0758   BP: 110/80     Objective    Well nourished female in no acute distress  A& O x 3  Respirations clear and non labored on room air  No edema noted and pulses  WNL bilaterally in lower extremities; no claudication  BS x 4; no guarding or tenderness  Breasts: no erythema or discharge. No masses or tenderness.   Low transverse incision is well healed. No signs/symptoms of infection or hematoma. Mild discomfort when palpating on the left side of incision. No mass palpated.       Assessment   Assessment:    1. Postpartum Exam  2. General Counseling and Advice on Contraception  3. Screening for Postpartum Depression    Plan   Plan:    1. Breastfeeding support   2. Continue PNV   3. Contraceptive counseling- No tubal consent in media from 4/4. Will follow up with  regarding misplacement of tubal consent. Desires IUD, name placed on list for [employer] funds. Discussed using condoms until IUD placed.  4. Discussed diet, exercise and resumption of sexual activity.  Discussed signs and symptoms of stress incontinence and reviewed pelvic floor exercises.    5. Follow up for IUD placement  6. She discontinued her labetalol on her own due to dizziness.BP WNL today.

## 2025-08-19 ENCOUNTER — GYNECOLOGY VISIT (OUTPATIENT)
Dept: OBGYN | Facility: CLINIC | Age: 40
End: 2025-08-19

## 2025-08-19 VITALS — DIASTOLIC BLOOD PRESSURE: 76 MMHG | WEIGHT: 167 LBS | BODY MASS INDEX: 29.58 KG/M2 | SYSTOLIC BLOOD PRESSURE: 108 MMHG

## 2025-08-19 DIAGNOSIS — Z30.017 NEXPLANON INSERTION: ICD-10-CM

## 2025-08-19 DIAGNOSIS — Z30.430 ENCOUNTER FOR INSERTION OF MIRENA IUD: Primary | ICD-10-CM

## 2025-08-19 LAB
POCT INT CON NEG: NEGATIVE
POCT INT CON POS: POSITIVE
POCT URINE PREGNANCY TEST: NEGATIVE

## 2025-08-19 PROCEDURE — 11981 INSERTION DRUG DLVR IMPLANT: CPT | Performed by: OBSTETRICS & GYNECOLOGY

## 2025-08-19 PROCEDURE — 81025 URINE PREGNANCY TEST: CPT | Performed by: OBSTETRICS & GYNECOLOGY

## 2025-08-19 ASSESSMENT — FIBROSIS 4 INDEX: FIB4 SCORE: 1.84

## (undated) DEVICE — SOLUTION PLASMA-LYTE PH 7.4 INJ 1000ML (14EA/CA)

## (undated) DEVICE — BLANKET STERILE CHICKIE FOR L&D (100/CA)

## (undated) DEVICE — BLANKET UNDERBODY FULL ACCES - (5/CA)

## (undated) DEVICE — SUTURE 1 CHROMIC CTX ETHICON - (36PK/BX)

## (undated) DEVICE — ELECTRODE DUAL RETURN W/ CORD - (50/PK)

## (undated) DEVICE — BAG SPONGE COUNT 10.25 X 32 - BLUE (250/CA)

## (undated) DEVICE — SUTURE 3-0 VICRYL PLUS CT-1 - 36 INCH (36/BX)

## (undated) DEVICE — SLEEVE VASO DVT COMPRESSION CALF MED - (10PR/CA)

## (undated) DEVICE — CATHETER IV NON-SAFETY 18 GA X 1 1/4 (50/BX 4BX/CA)

## (undated) DEVICE — RETAINER 9 1/8INX5 7/8IN MED - (10/BX)

## (undated) DEVICE — TRAY SPINAL ANESTHESIA NON-SAFETY (20/CA)

## (undated) DEVICE — CANNULA O2 COMFORT SOFT EAR ADULT 7 FT TUBING (50/CA)

## (undated) DEVICE — WATER IRRIGATION STERILE 1000ML (12EA/CA)

## (undated) DEVICE — SUTURE 0 VICRYL PLUS CT 36 (36PK/BX)"

## (undated) DEVICE — SUTURE 0 VICRYL PLUS CT-1 - 36 INCH (36/BX)

## (undated) DEVICE — CANISTER SUCTION 1200 CC MECHANICAL FILTER AUTO SHUT OFF MEDI-VAC STERILE LF DISP - (40EA/CA)

## (undated) DEVICE — PENCIL ELECTSURG 10FT BTN SWH - (50/CA)

## (undated) DEVICE — CANISTER SUCTION 3000ML MECHANICAL FILTER AUTO SHUTOFF MEDI-VAC NONSTERILE LF DISP (40EA/CA)

## (undated) DEVICE — RETRACTOR O C SECTION X-LRY - (5/BX)

## (undated) DEVICE — KIT  I.V. START (100EA/CA)

## (undated) DEVICE — KIT SKIN NOSE AND MOUTH PRE-OP (20/CA)

## (undated) DEVICE — BLANKET UNDERBODY ADULT - (10/CA)

## (undated) DEVICE — CLOSURE SKIN STRIP 1/2 X 4 IN - (STERI STRIP) (50/BX 4BX/CA)

## (undated) DEVICE — SET EXTENSION WITH 2 PORTS (48EA/CA) ***PART #2C8610 IS A SUBSTITUTE*****

## (undated) DEVICE — STAPLER SKIN DISP - (6/BX 10BX/CA) VISISTAT

## (undated) DEVICE — HEAD HOLDER JUNIOR/ADULT

## (undated) DEVICE — SUTURE 1 CHROMIC CT-1 (36PK/BX)

## (undated) DEVICE — PACK C-SECTION (2EA/CA)

## (undated) DEVICE — GLOVE BIOGEL INDICATOR SZ 7SURGICAL PF LTX - (50/BX 4BX/CA)

## (undated) DEVICE — SUTURE 4-0 MONOCRYL PLUS PS-2 - 27 INCH (36/BX)

## (undated) DEVICE — SODIUM CHL IRRIGATION 0.9% 1000ML (12EA/CA)

## (undated) DEVICE — ADHESIVE MASTISOL - (48/BX)

## (undated) DEVICE — GLOVE BIOGEL SZ 7 SURGICAL PF LTX - (50PR/BX 4BX/CA)

## (undated) DEVICE — DRESSING POST OP BORDER 4 X 10 (5EA/BX)

## (undated) DEVICE — PAD LAP STERILE 18 X 18 - (5/PK 40PK/CA)

## (undated) DEVICE — TUBING CLEARLINK DUO-VENT - C-FLO (48EA/CA)

## (undated) DEVICE — CHLORAPREP 26 ML APPLICATOR - ORANGE TINT(25/CA)